# Patient Record
Sex: FEMALE | Race: WHITE | NOT HISPANIC OR LATINO | Employment: OTHER | URBAN - METROPOLITAN AREA
[De-identification: names, ages, dates, MRNs, and addresses within clinical notes are randomized per-mention and may not be internally consistent; named-entity substitution may affect disease eponyms.]

---

## 2017-01-23 ENCOUNTER — APPOINTMENT (OUTPATIENT)
Dept: WOMENS IMAGING | Facility: HOSPITAL | Age: 72
End: 2017-01-23

## 2017-01-23 PROCEDURE — 77063 BREAST TOMOSYNTHESIS BI: CPT | Performed by: RADIOLOGY

## 2017-01-23 PROCEDURE — 77067 SCR MAMMO BI INCL CAD: CPT | Performed by: RADIOLOGY

## 2018-10-04 ENCOUNTER — APPOINTMENT (OUTPATIENT)
Dept: WOMENS IMAGING | Facility: HOSPITAL | Age: 73
End: 2018-10-04

## 2018-10-04 PROCEDURE — 77066 DX MAMMO INCL CAD BI: CPT | Performed by: RADIOLOGY

## 2018-10-04 PROCEDURE — 77062 BREAST TOMOSYNTHESIS BI: CPT | Performed by: RADIOLOGY

## 2018-10-04 PROCEDURE — 76641 ULTRASOUND BREAST COMPLETE: CPT | Performed by: RADIOLOGY

## 2020-06-29 ENCOUNTER — OFFICE VISIT (OUTPATIENT)
Dept: CARDIOLOGY | Facility: CLINIC | Age: 75
End: 2020-06-29

## 2020-06-29 VITALS
HEART RATE: 79 BPM | DIASTOLIC BLOOD PRESSURE: 80 MMHG | TEMPERATURE: 96.9 F | SYSTOLIC BLOOD PRESSURE: 160 MMHG | HEIGHT: 64 IN | BODY MASS INDEX: 31.89 KG/M2 | WEIGHT: 186.8 LBS

## 2020-06-29 DIAGNOSIS — R06.02 SHORTNESS OF BREATH: ICD-10-CM

## 2020-06-29 DIAGNOSIS — E78.1 HYPERTRIGLYCERIDEMIA: ICD-10-CM

## 2020-06-29 DIAGNOSIS — R73.9 HYPERGLYCEMIA: ICD-10-CM

## 2020-06-29 DIAGNOSIS — R07.89 CHEST PRESSURE: Primary | ICD-10-CM

## 2020-06-29 DIAGNOSIS — I10 ESSENTIAL HYPERTENSION: ICD-10-CM

## 2020-06-29 DIAGNOSIS — E88.81 METABOLIC SYNDROME: ICD-10-CM

## 2020-06-29 PROBLEM — E88.810 METABOLIC SYNDROME: Status: ACTIVE | Noted: 2020-06-29

## 2020-06-29 PROCEDURE — 93000 ELECTROCARDIOGRAM COMPLETE: CPT | Performed by: INTERNAL MEDICINE

## 2020-06-29 PROCEDURE — 99204 OFFICE O/P NEW MOD 45 MIN: CPT | Performed by: INTERNAL MEDICINE

## 2020-06-29 RX ORDER — ZINC GLUCONATE 50 MG
1 TABLET ORAL DAILY
COMMUNITY

## 2020-06-29 RX ORDER — LISINOPRIL 10 MG/1
10 TABLET ORAL DAILY
Qty: 30 TABLET | Refills: 11 | Status: SHIPPED | OUTPATIENT
Start: 2020-06-29 | End: 2021-02-17

## 2020-06-29 RX ORDER — ASPIRIN 81 MG/1
81 TABLET, CHEWABLE ORAL DAILY
COMMUNITY

## 2020-06-29 RX ORDER — MONTELUKAST SODIUM 10 MG/1
10 TABLET ORAL NIGHTLY
COMMUNITY

## 2020-06-29 RX ORDER — VIT A/VIT C/VIT E/ZINC/COPPER 4296-226
2 CAPSULE ORAL DAILY
COMMUNITY

## 2020-06-29 RX ORDER — TRIAMTERENE AND HYDROCHLOROTHIAZIDE 37.5; 25 MG/1; MG/1
TABLET ORAL
COMMUNITY
End: 2021-12-14 | Stop reason: SDUPTHER

## 2020-06-29 RX ORDER — METOPROLOL TARTRATE 50 MG/1
25 TABLET, FILM COATED ORAL 2 TIMES DAILY
COMMUNITY
End: 2021-12-14 | Stop reason: SDUPTHER

## 2020-06-29 NOTE — PROGRESS NOTES
Chief Complaint   Patient presents with   • Establish Care     PCP has referred for Dyspnes and Angina. Had Stress test in 2011 , results obtained. . Has strong family history of cardiac issues.   • Hypertension     Is having problems with Systolic reading high   • Med Refill     Had medication list with her today.   • Shortness of Breath     Has SOA with exertion, she reports has improved.        CARDIAC COMPLAINTS  chest pressure/discomfort, dyspnea and fatigue      Subjective   Khushboo Werner is a 75 y.o. female came in today for her initial cardiac evaluation.  She has history of hypertension and a family history of ischemic heart disease.  She has undergone a stress test in 2011 for chest pain and found to have poor exercise tolerance, increased chronotropic and blood pressure response, normal LV systolic function and no obvious ischemia.  She apparently was put on beta-blockers at that time.  Recently she has been having problems in controlling her blood pressure as well as having chest discomfort.  The chest discomfort is pressure-like feeling in the middle part of the chest which occurs mostly on exertion.  In a scale of 1-10 it can go up to 6 or 8 and normally it lasts for few minutes and subsides some of the episode is associated with diaphoresis.  She does notice increasing shortness of breath with exertion.  The symptoms do get better if she takes rest.  She denies having any palpitation, dizziness or loss of consciousness.  She denies having any orthopnea.  She did undergo some lab work recently and found to have mildly elevated blood sugar.  Her cholesterol is 154 with a triglyceride of 134 LDL of 87.  Her A1c was slightly elevated at 6.7.  She has no history of smoking but she does have a history of secondhand smoking.  Her brother has multiple problems including renal disease.  Her father did die of a heart attack.    Past Surgical History:   Procedure Laterality Date   • BREAST LUMPECTOMY Left 2011   •  CARDIOVASCULAR STRESS TEST  09/13/2011    1 Min. 36 Secs. 4.6 METS. 108% THR. BP- 220/90. EF > 65%. Negative.   • CARPAL TUNNEL RELEASE Right    • CATARACT EXTRACTION Bilateral 2017   • ECHO - CONVERTED  09/13/2011    Tachycardic. EF > 60%. Mild MR. RVSP- 29 mmHg.   • GALLBLADDER SURGERY  2017   • ROTATOR CUFF REPAIR Right 2015       Current Outpatient Medications   Medication Sig Dispense Refill   • aspirin 81 MG chewable tablet Chew 81 mg Daily.     • Calcium Carbonate-Vitamin D (CALTRATE 600+D PO) Take 2 tablets by mouth Daily.     • Glucosamine-Chondroitin (Osteo Bi-Flex Regular Strength) 250-200 MG tablet Take 1 tablet by mouth Daily.     • metoprolol tartrate (LOPRESSOR) 50 MG tablet Take 50 mg by mouth 2 (Two) Times a Day. 1/2 tablet twice daily     • montelukast (SINGULAIR) 10 MG tablet Take 10 mg by mouth Every Night.     • Multiple Vitamins-Minerals (ICAPS) capsule Take 2 tablets by mouth Daily.     • triamterene-hydrochlorothiazide (MAXZIDE-25) 37.5-25 MG per tablet Take 1 tablet by mouth Daily. Takes 1/2 tablet daily     • Zinc 50 MG tablet Take 1 tablet by mouth Daily.     • lisinopril (PRINIVIL,ZESTRIL) 10 MG tablet Take 1 tablet by mouth Daily. 30 tablet 11     No current facility-administered medications for this visit.            ALLERGIES:  Sulfa antibiotics    Past Medical History:   Diagnosis Date   • Cancer (CMS/HCC) 2011    Left breast    • Hypertension        Social History     Tobacco Use   Smoking Status Never Smoker   Smokeless Tobacco Never Used          Family History   Problem Relation Age of Onset   • Lung disease Mother    • Heart attack Father    • Hypertension Sister    • Cancer Sister    • Kidney disease Brother    • Heart disease Brother    • Diabetes Brother        Review of Systems   Constitution: Positive for malaise/fatigue. Negative for decreased appetite.   HENT: Negative for congestion and sore throat.    Eyes: Negative for blurred vision.   Cardiovascular: Positive for  "chest pain and dyspnea on exertion.   Respiratory: Positive for shortness of breath. Negative for snoring.    Endocrine: Negative for cold intolerance and heat intolerance.   Hematologic/Lymphatic: Negative for adenopathy. Does not bruise/bleed easily.   Skin: Negative for itching, nail changes and skin cancer.   Musculoskeletal: Negative for arthritis and myalgias.   Gastrointestinal: Negative for abdominal pain, dysphagia and heartburn.   Genitourinary: Negative for bladder incontinence and frequency.   Neurological: Negative for dizziness, light-headedness, seizures and vertigo.   Psychiatric/Behavioral: Negative for altered mental status.   Allergic/Immunologic: Negative for environmental allergies and hives.       Diabetes- No  Thyroid- normal    Objective     /80 (BP Location: Right arm)   Pulse 79   Temp 96.9 °F (36.1 °C)   Ht 162.6 cm (64\")   Wt 84.7 kg (186 lb 12.8 oz)   BMI 32.06 kg/m²     Physical Exam   Constitutional: She is oriented to person, place, and time. She appears well-developed and well-nourished.   HENT:   Head: Normocephalic.   Nose: Nose normal.   Eyes: Pupils are equal, round, and reactive to light. EOM are normal.   Neck: Normal range of motion. Neck supple.   Cardiovascular: Normal rate, regular rhythm, S1 normal and S2 normal.   Murmur heard.  Pulmonary/Chest: Effort normal and breath sounds normal.   Abdominal: Soft. Bowel sounds are normal.   Musculoskeletal: Normal range of motion. She exhibits no edema.   Neurological: She is alert and oriented to person, place, and time.   Skin: Skin is warm and dry.   Psychiatric: She has a normal mood and affect.         ECG 12 Lead  Date/Time: 6/29/2020 12:44 PM  Performed by: Jin Mae MD  Authorized by: Jin Mae MD   Previous ECG: no previous ECG available  Rhythm: sinus rhythm  Rate: normal  Other findings: non-specific ST-T wave changes    Clinical impression: non-specific ECG              Assessment/Plan   "   Patient's Body mass index is 32.06 kg/m². BMI is above normal parameters. Recommendations include: educational material, exercise counseling and nutrition counseling.     Khushboo was seen today for establish care, hypertension, med refill and shortness of breath.    Diagnoses and all orders for this visit:    Chest pressure  -     Stress Test With Myocardial Perfusion One Day; Future    Essential hypertension  -     lisinopril (PRINIVIL,ZESTRIL) 10 MG tablet; Take 1 tablet by mouth Daily.  -     Stress Test With Myocardial Perfusion One Day; Future    Hyperglycemia  -     Stress Test With Myocardial Perfusion One Day; Future    Hypertriglyceridemia    Shortness of breath  -     Adult Transthoracic Echo Complete W/ Cont if Necessary Per Protocol; Future    Metabolic syndrome       At baseline her heart rate is stable.  Blood pressure is elevated.  Her EKG shows sinus rhythm with diffuse nonspecific ST-T changes seen.  Her clinical examination reveals a BMI of 32.  She does have slightly loud second heart sound and a short systolic murmur at the mitral area.  She has normal peripheral pulse and no pedal edema.    The discomfort she describes appears to be anginal-like.  It does occurs mostly during the daytime.  Some of them are associated with chest tightness.  She need to undergo a stress test to evaluate for ischemia.  Since she is still not able to walk much.  I scheduled her stress test as a Lexiscan to evaluate for any ischemia.  I explained to her that if there is evidence of ischemia, then she may need to undergo elective cardiac catheterization.    Regarding her blood pressure, I did add lisinopril 10 mg once a day.  If her blood pressure continues to be elevated then she may need work-up was secondary hypertension.    Regarding her hypertriglyceridemia, I talked to her about the elevated sugar.  I talked to her about cutting down on the past.  I gave her papers on Mediterranean diet    Regarding the  hypertriglyceridemia, at this time we will try to treat with dietary changes.  If the triglyceride continues to be elevated, then she may need to be on a fenofibrate    The shortness of breath she has could be secondary to the metabolic syndrome but also could be from cardiac.  I scheduled her to undergo an echocardiogram to evaluate the LV function, valvular structures and PA pressure.    Based on the results, further recommendations will be made.             Electronically signed by Jin Mae MD June 29, 2020 12:22

## 2020-06-29 NOTE — PATIENT INSTRUCTIONS
Mediterranean Diet  A Mediterranean diet refers to food and lifestyle choices that are based on the traditions of countries located on the [place]. This way of eating has been shown to help prevent certain conditions and improve outcomes for people who have chronic diseases, like kidney disease and heart disease.  What are tips for following this plan?  Lifestyle  · Cook and eat meals together with your family, when possible.  · Drink enough fluid to keep your urine clear or pale yellow.  · Be physically active every day. This includes:  ? Aerobic exercise like running or swimming.  ? Leisure activities like gardening, walking, or housework.  · Get 7-8 hours of sleep each night.  · If recommended by your health care provider, drink red wine in moderation. This means 1 glass a day for nonpregnant women and 2 glasses a day for men. A glass of wine equals 5 oz (150 mL).  Reading food labels    · Check the serving size of packaged foods. For foods such as rice and pasta, the serving size refers to the amount of cooked product, not dry.  · Check the total fat in packaged foods. Avoid foods that have saturated fat or trans fats.  · Check the ingredients list for added sugars, such as corn syrup.  Shopping  · At the grocery store, buy most of your food from the areas near the walls of the store. This includes:  ? Fresh fruits and vegetables (produce).  ? Grains, beans, nuts, and seeds. Some of these may be available in unpackaged forms or large amounts (in bulk).  ? Fresh seafood.  ? Poultry and eggs.  ? Low-fat dairy products.  · Buy whole ingredients instead of prepackaged foods.  · Buy fresh fruits and vegetables in-season from local farmers markets.  · Buy frozen fruits and vegetables in resealable bags.  · If you do not have access to quality fresh seafood, buy precooked frozen shrimp or canned fish, such as tuna, salmon, or sardines.  · Buy small amounts of raw or cooked vegetables, salads, or olives from  the deli or salad bar at your store.  · Stock your pantry so you always have certain foods on hand, such as olive oil, canned tuna, canned tomatoes, rice, pasta, and beans.  Cooking  · Cook foods with extra-virgin olive oil instead of using butter or other vegetable oils.  · Have meat as a side dish, and have vegetables or grains as your main dish. This means having meat in small portions or adding small amounts of meat to foods like pasta or stew.  · Use beans or vegetables instead of meat in common dishes like chili or lasagna.  · [place] with different cooking methods. Try roasting or broiling vegetables instead of steaming or sautéeing them.  · Add frozen vegetables to soups, stews, pasta, or rice.  · Add nuts or seeds for added healthy fat at each meal. You can add these to yogurt, salads, or vegetable dishes.  · Marinate fish or vegetables using olive oil, lemon juice, garlic, and fresh herbs.  Meal planning    · Plan to eat 1 vegetarian meal one day each week. Try to work up to 2 vegetarian meals, if possible.  · Eat seafood 2 or more times a week.  · Have healthy snacks readily available, such as:  ? Vegetable sticks with hummus.  ? Greek yogurt.  ? Fruit and nut trail mix.  · Eat balanced meals throughout the week. This includes:  ? Fruit: 2-3 servings a day  ? Vegetables: 4-5 servings a day  ? Low-fat dairy: 2 servings a day  ? Fish, poultry, or lean meat: 1 serving a day  ? Beans and legumes: 2 or more servings a week  ? Nuts and seeds: 1-2 servings a day  ? Whole grains: 6-8 servings a day  ? Extra-virgin olive oil: 3-4 servings a day  · Limit red meat and sweets to only a few servings a month  What are my food choices?  · Mediterranean diet  ? Recommended  § Grains: Whole-grain pasta. Brown rice. Bulgar wheat. Polenta. Couscous. Whole-wheat bread. Oatmeal. Quinoa.  § Vegetables: Artichokes. Beets. Broccoli. Cabbage. Carrots. Eggplant. Green beans. Chard. Kale. Spinach. Onions. Leeks. Peas. Squash.  Tomatoes. Peppers. Radishes.  § Fruits: Apples. Apricots. Avocado. Berries. Bananas. Cherries. Dates. Figs. Grapes. Akash. Melon. Oranges. Peaches. Plums. Pomegranate.  § Meats and other protein foods: Beans. Almonds. Sunflower seeds. Pine nuts. Peanuts. Cod. [place]. Scallops. Shrimp. Tuna. Tilapia. Clams. Oysters. Eggs.  § Dairy: Low-fat milk. Cheese. Greek yogurt.  § Beverages: Water. Red wine. Herbal tea.  § Fats and oils: Extra virgin olive oil. Avocado oil. Grape seed oil.  § Sweets and desserts: Greek yogurt with honey. Baked apples. Poached pears. Trail mix.  § Seasoning and other foods: Basil. Cilantro. Coriander. Cumin. Mint. Parsley. Yao. Rosemary. Tarragon. Garlic. Oregano. Thyme. Pepper. Balsalmic vinegar. Tahini. Hummus. Tomato sauce. Olives. Mushrooms.  ? Limit these  § Grains: Prepackaged pasta or rice dishes. Prepackaged cereal with added sugar.  § Vegetables: Deep fried potatoes (french fries).  § Fruits: Fruit canned in syrup.  § Meats and other protein foods: Beef. Pork. Lamb. Poultry with skin. Hot dogs. [place].  § Dairy: Ice cream. Sour cream. Whole milk.  § Beverages: Juice. Sugar-sweetened soft drinks. Beer. Liquor and spirits.  § Fats and oils: Butter. Canola oil. Vegetable oil. Beef fat (tallow). Lard.  § Sweets and desserts: Cookies. Cakes. Pies. Candy.  § Seasoning and other foods: Mayonnaise. Premade sauces and marinades.  The items listed may not be a complete list. Talk with your dietitian about what dietary choices are right for you.  Summary  · The Mediterranean diet includes both food and lifestyle choices.  · Eat a variety of fresh fruits and vegetables, beans, nuts, seeds, and whole grains.  · Limit the amount of red meat and sweets that you eat.  · Talk with your health care provider about whether it is safe for you to drink red wine in moderation. This means 1 glass a day for nonpregnant women and 2 glasses a day for men. A glass of wine equals 5 oz (150 mL).  This information  is not intended to replace advice given to you by your health care provider. Make sure you discuss any questions you have with your health care provider.  Document Released: 08/10/2017 Document Revised: 08/17/2017 Document Reviewed: 08/10/2017  Elsevier Patient Education © 2020 [employer]

## 2020-07-01 ENCOUNTER — HOSPITAL ENCOUNTER (OUTPATIENT)
Dept: CARDIOLOGY | Facility: HOSPITAL | Age: 75
Discharge: HOME | End: 2020-07-01

## 2020-07-01 DIAGNOSIS — R07.89 CHEST PRESSURE: ICD-10-CM

## 2020-07-01 DIAGNOSIS — R06.02 SHORTNESS OF BREATH: ICD-10-CM

## 2020-07-01 DIAGNOSIS — R73.9 HYPERGLYCEMIA: ICD-10-CM

## 2020-07-01 DIAGNOSIS — I10 ESSENTIAL HYPERTENSION: ICD-10-CM

## 2020-07-01 LAB
BH CV ECHO MEAS - ACS: 1.7 CM
BH CV ECHO MEAS - AO MAX PG: 6.6 MMHG
BH CV ECHO MEAS - AO MEAN PG: 4 MMHG
BH CV ECHO MEAS - AO ROOT AREA (BSA CORRECTED): 1.3
BH CV ECHO MEAS - AO ROOT AREA: 4.9 CM^2
BH CV ECHO MEAS - AO ROOT DIAM: 2.5 CM
BH CV ECHO MEAS - AO V2 MAX: 128 CM/SEC
BH CV ECHO MEAS - AO V2 MEAN: 90.7 CM/SEC
BH CV ECHO MEAS - AO V2 VTI: 27.1 CM
BH CV ECHO MEAS - BSA(HAYCOCK): 2 M^2
BH CV ECHO MEAS - BSA: 1.9 M^2
BH CV ECHO MEAS - BZI_BMI: 31.9 KILOGRAMS/M^2
BH CV ECHO MEAS - BZI_METRIC_HEIGHT: 162.6 CM
BH CV ECHO MEAS - BZI_METRIC_WEIGHT: 84.4 KG
BH CV ECHO MEAS - EDV(CUBED): 19.2 ML
BH CV ECHO MEAS - EDV(MOD-SP4): 61.2 ML
BH CV ECHO MEAS - EDV(TEICH): 26.5 ML
BH CV ECHO MEAS - EF(CUBED): 70.2 %
BH CV ECHO MEAS - EF(MOD-SP4): 59.6 %
BH CV ECHO MEAS - EF(TEICH): 63.9 %
BH CV ECHO MEAS - ESV(CUBED): 5.7 ML
BH CV ECHO MEAS - ESV(MOD-SP4): 24.7 ML
BH CV ECHO MEAS - ESV(TEICH): 9.6 ML
BH CV ECHO MEAS - FS: 33.2 %
BH CV ECHO MEAS - IVS/LVPW: 0.86
BH CV ECHO MEAS - IVSD: 1.3 CM
BH CV ECHO MEAS - LA DIMENSION: 3.3 CM
BH CV ECHO MEAS - LA/AO: 1.3
BH CV ECHO MEAS - LV DIASTOLIC VOL/BSA (35-75): 32.3 ML/M^2
BH CV ECHO MEAS - LV IVRT: 0.1 SEC
BH CV ECHO MEAS - LV MASS(C)D: 116.3 GRAMS
BH CV ECHO MEAS - LV MASS(C)DI: 61.3 GRAMS/M^2
BH CV ECHO MEAS - LV SYSTOLIC VOL/BSA (12-30): 13 ML/M^2
BH CV ECHO MEAS - LVIDD: 2.7 CM
BH CV ECHO MEAS - LVIDS: 1.8 CM
BH CV ECHO MEAS - LVLD AP4: 6.7 CM
BH CV ECHO MEAS - LVLS AP4: 5.4 CM
BH CV ECHO MEAS - LVOT AREA (M): 2.8 CM^2
BH CV ECHO MEAS - LVOT AREA: 2.8 CM^2
BH CV ECHO MEAS - LVOT DIAM: 1.9 CM
BH CV ECHO MEAS - LVPWD: 1.5 CM
BH CV ECHO MEAS - MV A MAX VEL: 104 CM/SEC
BH CV ECHO MEAS - MV DEC SLOPE: 192 CM/SEC^2
BH CV ECHO MEAS - MV E MAX VEL: 72 CM/SEC
BH CV ECHO MEAS - MV E/A: 0.69
BH CV ECHO MEAS - RVDD: 2.5 CM
BH CV ECHO MEAS - SI(AO): 70.1 ML/M^2
BH CV ECHO MEAS - SI(CUBED): 7.1 ML/M^2
BH CV ECHO MEAS - SI(MOD-SP4): 19.2 ML/M^2
BH CV ECHO MEAS - SI(TEICH): 8.9 ML/M^2
BH CV ECHO MEAS - SV(AO): 133 ML
BH CV ECHO MEAS - SV(CUBED): 13.5 ML
BH CV ECHO MEAS - SV(MOD-SP4): 36.5 ML
BH CV ECHO MEAS - SV(TEICH): 16.9 ML
BH CV STRESS COMMENTS STAGE 1: NORMAL
BH CV STRESS DOSE REGADENOSON STAGE 1: 0.4
BH CV STRESS DURATION MIN STAGE 1: 0
BH CV STRESS DURATION SEC STAGE 1: 10
BH CV STRESS PROTOCOL 1: NORMAL
BH CV STRESS RECOVERY BP: NORMAL MMHG
BH CV STRESS RECOVERY HR: 91 BPM
BH CV STRESS STAGE 1: 1
LV EF NUC BP: 77 %
MAXIMAL PREDICTED HEART RATE: 147 BPM
MAXIMAL PREDICTED HEART RATE: 147 BPM
PERCENT MAX PREDICTED HR: 67.35 %
STRESS BASELINE BP: NORMAL MMHG
STRESS BASELINE HR: 74 BPM
STRESS PERCENT HR: 79 %
STRESS POST PEAK BP: NORMAL MMHG
STRESS POST PEAK HR: 99 BPM
STRESS TARGET HR: 125 BPM
STRESS TARGET HR: 125 BPM

## 2020-07-01 PROCEDURE — 78452 HT MUSCLE IMAGE SPECT MULT: CPT

## 2020-07-01 PROCEDURE — 93016 CV STRESS TEST SUPVJ ONLY: CPT | Performed by: NURSE PRACTITIONER

## 2020-07-01 PROCEDURE — 93306 TTE W/DOPPLER COMPLETE: CPT

## 2020-07-01 PROCEDURE — 93017 CV STRESS TEST TRACING ONLY: CPT

## 2020-07-01 PROCEDURE — 93018 CV STRESS TEST I&R ONLY: CPT | Performed by: INTERNAL MEDICINE

## 2020-07-01 PROCEDURE — 0 TECHNETIUM SESTAMIBI: Performed by: INTERNAL MEDICINE

## 2020-07-01 PROCEDURE — 25010000002 REGADENOSON 0.4 MG/5ML SOLUTION: Performed by: INTERNAL MEDICINE

## 2020-07-01 PROCEDURE — A9500 TC99M SESTAMIBI: HCPCS | Performed by: INTERNAL MEDICINE

## 2020-07-01 PROCEDURE — 78452 HT MUSCLE IMAGE SPECT MULT: CPT | Performed by: INTERNAL MEDICINE

## 2020-07-01 PROCEDURE — 93306 TTE W/DOPPLER COMPLETE: CPT | Performed by: INTERNAL MEDICINE

## 2020-07-01 RX ADMIN — REGADENOSON 0.4 MG: 0.08 INJECTION, SOLUTION INTRAVENOUS at 09:57

## 2020-07-01 RX ADMIN — TECHNETIUM TC 99M SESTAMIBI 1 DOSE: 1 INJECTION INTRAVENOUS at 09:57

## 2020-07-01 RX ADMIN — TECHNETIUM TC 99M SESTAMIBI 1 DOSE: 1 INJECTION INTRAVENOUS at 09:56

## 2020-07-02 ENCOUNTER — TELEPHONE (OUTPATIENT)
Dept: CARDIOLOGY | Facility: CLINIC | Age: 75
End: 2020-07-02

## 2020-07-02 RX ORDER — ISOSORBIDE MONONITRATE 30 MG/1
30 TABLET, EXTENDED RELEASE ORAL DAILY
Qty: 30 TABLET | Refills: 6 | Status: SHIPPED | OUTPATIENT
Start: 2020-07-02 | End: 2020-10-23

## 2020-07-02 NOTE — TELEPHONE ENCOUNTER
Patient aware of stress test and echo results and recommendations to add Imdur 30 mg daily and to call the office if she continues to have symptoms.  She denies chest pain, currently.

## 2020-10-15 ENCOUNTER — TELEPHONE (OUTPATIENT)
Dept: CARDIOLOGY | Facility: CLINIC | Age: 75
End: 2020-10-15

## 2020-10-15 NOTE — TELEPHONE ENCOUNTER
Received fax from Dr. Orozco for cardiac clearance for patient to have a breast biopsy. Patient is on aspirin and they are requesting to hold for 5 days prior to procedure. According to our records, I do not see where patient has had any stenting. Patient had a stress test on 07/01/2020 Lexiscan- EF 77%. R/O Apical Ischemia.    Fax [phone]

## 2020-10-23 RX ORDER — ISOSORBIDE MONONITRATE 30 MG/1
TABLET, EXTENDED RELEASE ORAL
Qty: 90 TABLET | Refills: 2 | Status: SHIPPED | OUTPATIENT
Start: 2020-10-23 | End: 2021-05-25 | Stop reason: SDUPTHER

## 2020-11-17 ENCOUNTER — OFFICE VISIT (OUTPATIENT)
Dept: CARDIOLOGY | Facility: CLINIC | Age: 75
End: 2020-11-17

## 2020-11-17 VITALS
DIASTOLIC BLOOD PRESSURE: 60 MMHG | BODY MASS INDEX: 29.19 KG/M2 | TEMPERATURE: 97.1 F | HEART RATE: 64 BPM | SYSTOLIC BLOOD PRESSURE: 120 MMHG | HEIGHT: 64 IN | WEIGHT: 171 LBS

## 2020-11-17 DIAGNOSIS — R06.02 SHORTNESS OF BREATH: ICD-10-CM

## 2020-11-17 DIAGNOSIS — I10 ESSENTIAL HYPERTENSION: Primary | ICD-10-CM

## 2020-11-17 DIAGNOSIS — E11.9 TYPE 2 DIABETES MELLITUS WITHOUT COMPLICATION, WITHOUT LONG-TERM CURRENT USE OF INSULIN (HCC): ICD-10-CM

## 2020-11-17 PROCEDURE — 99213 OFFICE O/P EST LOW 20 MIN: CPT | Performed by: NURSE PRACTITIONER

## 2020-11-17 RX ORDER — LETROZOLE 2.5 MG/1
TABLET, FILM COATED ORAL DAILY
COMMUNITY

## 2020-11-17 NOTE — PROGRESS NOTES
Chief Complaint   Patient presents with   • Follow-up     Cardiac management.   • Lab     Has copy of most recent labs. Does not need refills at this time.   • Weight loss     Has only been eating one meal a day, she is still eating snacks.     Randy Werner is a 75 y.o. female with hypertension, diet controlled diabetes and strong family history of IHD who was referred back for cardiac evaluation. She underwent a stress test in 2011 for chest pain found to have hypertensive response but no ischemia. Beta blockers started. She was diagnosed with (L) breast CA in 2018 and underwent lumpectomy and radiation. Recently she noticed increasing dyspnea on exertion. Cardiac work up was repeated with Lexiscan showing normal blood pressure response and mild changes in the apex. LV function was normal. Imdur was started with plan for cath if symptoms persisted. Lipids 6/2020 were well controlled with LDL 87, HDL 40, Tri 134, . A1C 6.7%.     She returns today for follow up. She denies anginal symptoms. VELASCO improved. She has no cardiac complaints today. Tolerating Imdur. She has followed a strict diet and has lost 15 pounds. Labs brought in today showed normal CBC, CMP, A1C 6.8%. She continues to be managed with diet.          Cardiac History:    Past Surgical History:   Procedure Laterality Date   • BREAST LUMPECTOMY Left 2011   • CARDIOVASCULAR STRESS TEST  09/13/2011    1 Min. 36 Secs. 4.6 METS. 108% THR. BP- 220/90. EF > 65%. Negative.   • CARDIOVASCULAR STRESS TEST  07/01/2020    Lexiscan- EF 77%. R/O Apical Ischemia.   • CARPAL TUNNEL RELEASE Right    • CATARACT EXTRACTION Bilateral 2017   • ECHO - CONVERTED  09/13/2011    Tachycardic. EF > 60%. Mild MR. RVSP- 29 mmHg.   • ECHO - CONVERTED  07/01/2020    EF 65%. Trace-Mild MR   • GALLBLADDER SURGERY  2017   • ROTATOR CUFF REPAIR Right 2015       Current Outpatient Medications   Medication Sig Dispense Refill   • aspirin 81 MG chewable tablet Chew 81 mg  Daily.     • Calcium Carbonate-Vitamin D (CALTRATE 600+D PO) Take 2 tablets by mouth Daily.     • Glucosamine-Chondroitin (Osteo Bi-Flex Regular Strength) 250-200 MG tablet Take 1 tablet by mouth Daily.     • isosorbide mononitrate (IMDUR) 30 MG 24 hr tablet TAKE 1 TABLET BY MOUTH EVERY DAY 90 tablet 2   • letrozole (FEMARA) 2.5 MG tablet Take  by mouth Daily.     • lisinopril (PRINIVIL,ZESTRIL) 10 MG tablet Take 1 tablet by mouth Daily. 30 tablet 11   • metoprolol tartrate (LOPRESSOR) 50 MG tablet Take 25 mg by mouth 2 (Two) Times a Day.     • montelukast (SINGULAIR) 10 MG tablet Take 10 mg by mouth Every Night.     • Multiple Vitamins-Minerals (ICAPS) capsule Take 2 tablets by mouth Daily.     • triamterene-hydrochlorothiazide (MAXZIDE-25) 37.5-25 MG per tablet Takes 1/2 tablet daily      • Zinc 50 MG tablet Take 1 tablet by mouth Daily.       No current facility-administered medications for this visit.      Sulfa antibiotics    Past Medical History:   Diagnosis Date   • Cancer (CMS/[facility]) 2011    Left breast    • Hypertension      Social History     Socioeconomic History   • Marital status:      Spouse name: Not on file   • Number of children: Not on file   • Years of education: Not on file   • Highest education level: Not on file   Tobacco Use   • Smoking status: Never Smoker   • Smokeless tobacco: Never Used   Substance and Sexual Activity   • Alcohol use: Not Currently   • Drug use: Never   • Sexual activity: Defer     Family History   Problem Relation Age of Onset   • Lung disease Mother    • Heart attack Father    • Hypertension Sister    • Cancer Sister    • Kidney disease Brother    • Heart disease Brother    • Diabetes Brother      Review of Systems   Constitution: Positive for weight loss (with diet). Negative for decreased appetite and malaise/fatigue.   HENT: Negative.    Eyes: Negative for blurred vision.   Cardiovascular: Positive for dyspnea on exertion (improved). Negative for chest pain, leg  "swelling, palpitations and syncope.   Respiratory: Negative for shortness of breath and sleep disturbances due to breathing.    Endocrine: Negative.    Hematologic/Lymphatic: Negative for bleeding problem. Does not bruise/bleed easily.   Skin: Negative.    Musculoskeletal: Negative for falls and myalgias.   Gastrointestinal: Negative for abdominal pain, heartburn and melena.   Genitourinary: Negative for hematuria.   Neurological: Negative for dizziness and light-headedness.   Psychiatric/Behavioral: Negative for altered mental status.   Allergic/Immunologic: Negative.       Objective     /60 (BP Location: Right arm)   Pulse 64   Temp 97.1 °F (36.2 °C)   Ht 162.6 cm (64.02\")   Wt 77.6 kg (171 lb)   BMI 29.34 kg/m²     Vitals signs and nursing note reviewed.   Constitutional:       General: Not in acute distress.     Appearance: Well-developed. Not diaphoretic.   Eyes:      Pupils: Pupils are equal, round, and reactive to light.   HENT:      Head: Normocephalic.   Neck:      Musculoskeletal: Normal range of motion.   Pulmonary:      Effort: Pulmonary effort is normal. No respiratory distress.      Breath sounds: Normal breath sounds.   Cardiovascular:      Normal rate. Regular rhythm.   Pulses:     Intact distal pulses.   Abdominal:      General: Bowel sounds are normal.      Palpations: Abdomen is soft.   Musculoskeletal: Normal range of motion.   Skin:     General: Skin is warm and dry.   Neurological:      Mental Status: Alert and oriented to person, place, and time.        Procedures          Problem List Items Addressed This Visit        Cardiovascular and Mediastinum    Essential hypertension - Primary       Respiratory    Shortness of breath       Endocrine    Type 2 diabetes mellitus without complication, without long-term current use of insulin (CMS/[facility])         1. HTN- well controlled. Continue metoprolol, lisinopril. Limit Na. She was congratulated on her weight loss.     2. DM- A1C remains " 6.8%. Continue strict diet. Continue ACE inhibitor, aspirin.     3. VELASCO/SOB- reviewed stress test findings with her showing possible apical ischemia.  Symptoms have improved with isosorbide. Continue medical management. She agrees to contact office should she develop any new or worsening symptoms. Cardiac cath will be recommended.     No changes made today. Continue risk factor modification. Follow up in six months.     Patient's Body mass index is 29.34 kg/m². BMI is above normal parameters. Recommendations include: nutrition counseling.            Electronically signed by KAMLESH Roberts,  November 17, 2020 11:30 EST

## 2021-02-15 DIAGNOSIS — I10 ESSENTIAL HYPERTENSION: ICD-10-CM

## 2021-02-17 RX ORDER — LISINOPRIL 10 MG/1
TABLET ORAL
Qty: 90 TABLET | Refills: 3 | Status: SHIPPED | OUTPATIENT
Start: 2021-02-17 | End: 2021-12-14 | Stop reason: SDUPTHER

## 2021-04-15 ENCOUNTER — HOSPITAL ENCOUNTER (OUTPATIENT)
Dept: MRI IMAGING | Facility: HOSPITAL | Age: 76
Discharge: HOME | End: 2021-04-15
Admitting: RADIOLOGY

## 2021-04-15 DIAGNOSIS — Z85.3 PERSONAL HISTORY OF MALIGNANT NEOPLASM OF BREAST: ICD-10-CM

## 2021-04-15 LAB — CREAT BLDA-MCNC: 1 MG/DL (ref 0.6–1.3)

## 2021-04-15 PROCEDURE — A9577 INJ MULTIHANCE: HCPCS

## 2021-04-15 PROCEDURE — C8937 CAD BREAST MRI: HCPCS

## 2021-04-15 PROCEDURE — 0 GADOBENATE DIMEGLUMINE 529 MG/ML SOLUTION

## 2021-04-15 PROCEDURE — 77049 MRI BREAST C-+ W/CAD BI: CPT | Performed by: RADIOLOGY

## 2021-04-15 PROCEDURE — 82565 ASSAY OF CREATININE: CPT

## 2021-04-15 PROCEDURE — 0 GADOBENATE DIMEGLUMINE 529 MG/ML SOLUTION: Performed by: RADIOLOGY

## 2021-04-15 PROCEDURE — A9577 INJ MULTIHANCE: HCPCS | Performed by: RADIOLOGY

## 2021-04-15 PROCEDURE — C8908 MRI W/O FOL W/CONT, BREAST,: HCPCS

## 2021-04-15 RX ADMIN — GADOBENATE DIMEGLUMINE 15 ML: 529 INJECTION, SOLUTION INTRAVENOUS at 11:15

## 2021-05-25 ENCOUNTER — OFFICE VISIT (OUTPATIENT)
Dept: CARDIOLOGY | Facility: CLINIC | Age: 76
End: 2021-05-25

## 2021-05-25 VITALS
DIASTOLIC BLOOD PRESSURE: 60 MMHG | WEIGHT: 171 LBS | BODY MASS INDEX: 29.19 KG/M2 | HEART RATE: 72 BPM | SYSTOLIC BLOOD PRESSURE: 110 MMHG | HEIGHT: 64 IN

## 2021-05-25 DIAGNOSIS — E88.81 METABOLIC SYNDROME: ICD-10-CM

## 2021-05-25 DIAGNOSIS — E11.9 TYPE 2 DIABETES MELLITUS WITHOUT COMPLICATION, WITHOUT LONG-TERM CURRENT USE OF INSULIN (HCC): ICD-10-CM

## 2021-05-25 DIAGNOSIS — I10 ESSENTIAL HYPERTENSION: ICD-10-CM

## 2021-05-25 DIAGNOSIS — E78.1 HYPERTRIGLYCERIDEMIA: ICD-10-CM

## 2021-05-25 DIAGNOSIS — R42 DIZZINESS: Primary | ICD-10-CM

## 2021-05-25 PROCEDURE — 99213 OFFICE O/P EST LOW 20 MIN: CPT | Performed by: NURSE PRACTITIONER

## 2021-05-25 RX ORDER — ISOSORBIDE MONONITRATE 30 MG/1
30 TABLET, EXTENDED RELEASE ORAL DAILY
Qty: 90 TABLET | Refills: 2 | Status: SHIPPED | OUTPATIENT
Start: 2021-05-25 | End: 2021-12-14 | Stop reason: SDUPTHER

## 2021-05-25 RX ORDER — TRAZODONE HYDROCHLORIDE 50 MG/1
25 TABLET ORAL NIGHTLY
COMMUNITY
End: 2023-01-30

## 2021-05-25 NOTE — PROGRESS NOTES
Chief Complaint   Patient presents with   • Follow-up     Cardiac management   • Lab     Last labs couple months ago per PCP.   • Dizziness     PCP felt related to vertigo, has been having falls.   • Med Refill     Needs refills on Imdur-90 day.     Randy Werner is a 76 y.o. female with hypertension, diet controlled diabetes and strong family history of IHD who was referred back for cardiac evaluation. She underwent a stress test in 2011 for chest pain found to have hypertensive response but no ischemia. Beta blockers started. She was diagnosed with (L) breast CA in 2018 and underwent lumpectomy and radiation.   She was referred back in June 2020 after she noticed increasing dyspnea on exertion. Cardiac work up was repeated with Lexiscan showing normal blood pressure response and mild changes in the apex. LV function was normal. Imdur was started with plan for cath if symptoms persisted. Lipids 6/2020 were well controlled with diet alone, LDL 87, HDL 40, Tri 134, . A1C 6.7%.     She returns today for follow-up visit.  She denies having any chest discomfort.  She recently has noticed vertigo symptoms, she has a room spinning sensation, worse with turning her head.  She denies slurred speech, unilateral numbness.  She has difficulty with her vision secondary to macular degeneration.  Blood pressure has been well controlled.  She is concerned about her risk of stroke as her brother recently suffered a significant CVA.         Cardiac History:    Past Surgical History:   Procedure Laterality Date   • CARDIOVASCULAR STRESS TEST  09/13/2011    1 Min. 36 Secs. 4.6 METS. 108% THR. BP- 220/90. EF > 65%. Negative.   • CARDIOVASCULAR STRESS TEST  07/01/2020    Lexiscan- EF 77%. R/O Apical Ischemia.   • ECHO - CONVERTED  09/13/2011    Tachycardic. EF > 60%. Mild MR. RVSP- 29 mmHg.   • ECHO - CONVERTED  07/01/2020    EF 65%. Trace-Mild MR     Current Outpatient Medications   Medication Sig Dispense Refill    • aspirin 81 MG chewable tablet Chew 81 mg Daily.     • Calcium Carbonate-Vitamin D (CALTRATE 600+D PO) Take 2 tablets by mouth Daily.     • Glucosamine-Chondroitin (Osteo Bi-Flex Regular Strength) 250-200 MG tablet Take 1 tablet by mouth Daily.     • isosorbide mononitrate (IMDUR) 30 MG 24 hr tablet Take 1 tablet by mouth Daily. 90 tablet 2   • letrozole (FEMARA) 2.5 MG tablet Take  by mouth Daily.     • lisinopril (PRINIVIL,ZESTRIL) 10 MG tablet TAKE 1 TABLET BY MOUTH EVERY DAY 90 tablet 3   • metoprolol tartrate (LOPRESSOR) 50 MG tablet Take 25 mg by mouth 2 (Two) Times a Day.     • montelukast (SINGULAIR) 10 MG tablet Take 10 mg by mouth Every Night.     • Multiple Vitamins-Minerals (ICAPS) capsule Take 2 tablets by mouth Daily.     • traZODone (DESYREL) 50 MG tablet Take 25 mg by mouth Every Night.     • triamterene-hydrochlorothiazide (MAXZIDE-25) 37.5-25 MG per tablet Takes 1/2 tablet daily      • Zinc 50 MG tablet Take 1 tablet by mouth Daily.       No current facility-administered medications for this visit.     Sulfa antibiotics and Latex    Past Medical History:   Diagnosis Date   • Cancer (CMS/HCC) 2011    Left breast    • Hypertension      Social History     Socioeconomic History   • Marital status:      Spouse name: Not on file   • Number of children: Not on file   • Years of education: Not on file   • Highest education level: Not on file   Tobacco Use   • Smoking status: Never Smoker   • Smokeless tobacco: Never Used   Vaping Use   • Vaping Use: Never used   Substance and Sexual Activity   • Alcohol use: Not Currently   • Drug use: Never   • Sexual activity: Defer     Family History   Problem Relation Age of Onset   • Lung disease Mother    • Heart attack Father    • Hypertension Sister    • Cancer Sister    • Kidney disease Brother    • Heart disease Brother    • Diabetes Brother      Review of Systems   Constitutional: Negative for decreased appetite, malaise/fatigue, weight gain and weight  "loss.   HENT: Negative.    Eyes: Negative for blurred vision.   Cardiovascular: Negative for chest pain, dyspnea on exertion, leg swelling, palpitations and syncope.   Respiratory: Negative for shortness of breath and sleep disturbances due to breathing.    Endocrine: Negative.    Hematologic/Lymphatic: Negative for bleeding problem. Does not bruise/bleed easily.   Skin: Negative.    Musculoskeletal: Negative for falls and myalgias.   Gastrointestinal: Negative for abdominal pain, heartburn and melena.   Genitourinary: Negative for hematuria.   Neurological: Positive for dizziness and vertigo. Negative for light-headedness.   Psychiatric/Behavioral: Negative for altered mental status.   Allergic/Immunologic: Negative.         Objective     /60 (BP Location: Right arm)   Pulse 72   Ht 162.6 cm (64.02\")   Wt 77.6 kg (171 lb)   BMI 29.34 kg/m²     Vitals and nursing note reviewed.   Constitutional:       General: Not in acute distress.     Appearance: Well-developed. Not diaphoretic.   Eyes:      Pupils: Pupils are equal, round, and reactive to light.   HENT:      Head: Normocephalic.   Pulmonary:      Effort: Pulmonary effort is normal. No respiratory distress.      Breath sounds: Normal breath sounds.   Cardiovascular:      Normal rate. Regular rhythm.      Murmurs: There is a grade 1/6 systolic murmur at the apex.   Pulses:     Intact distal pulses.   Edema:     Peripheral edema absent.   Abdominal:      General: Bowel sounds are normal.      Palpations: Abdomen is soft.   Musculoskeletal: Normal range of motion.      Cervical back: Normal range of motion. Skin:     General: Skin is warm and dry.   Neurological:      Mental Status: Alert and oriented to person, place, and time.        Procedures          Problem List Items Addressed This Visit        Cardiac and Vasculature    Hypertriglyceridemia    Essential hypertension       Endocrine and Metabolic    Metabolic syndrome    Type 2 diabetes mellitus " without complication, without long-term current use of insulin (CMS/[facility])      Other Visit Diagnoses     Dizziness    -  Primary    Relevant Orders    US Carotid Bilateral         1.  Abnormal stress test-Lexiscan stress 7/2020 questionable apical ischemia, managed medically.  Continue Imdur 30 mg daily as she has no chest discomfort.    2.  HTN-very well controlled at 110/60.  Continue lisinopril, metoprolol, triamterene/HCTZ.  Limit sodium.  Weight loss.    3.  Lipids-cholesterol particle numbers are optimal without medication.  Continue heart healthy diet.    4.  Diabetes-continues to be diet controlled with A1C remaining less than 7%.  Labs are followed by Dr. Del Toro.    5.  Dizziness-appears to be BPPV.  With family history of CVA, will check carotid ultrasound to rule out stenosis.      No changes made today.  Cardiac status appears stable.  Refill sent for isosorbide.  Follow-up in 6 months or sooner as needed.    Patient's Body mass index is 29.34 kg/m². indicating that she is overweight (BMI 25-29.9). Obesity-related health conditions include the following: hypertension, coronary heart disease, diabetes mellitus and dyslipidemias. Obesity is unchanged. BMI is is above average; BMI management plan is completed. We discussed portion control and increasing exercise.            Electronically signed by KAMLESH Roberts,  May 27, 2021 08:00 EDT

## 2021-06-14 ENCOUNTER — HOSPITAL ENCOUNTER (OUTPATIENT)
Dept: CARDIOLOGY | Facility: HOSPITAL | Age: 76
Discharge: HOME | End: 2021-06-14
Admitting: NURSE PRACTITIONER

## 2021-06-14 DIAGNOSIS — R42 DIZZINESS: ICD-10-CM

## 2021-06-14 PROCEDURE — 93880 EXTRACRANIAL BILAT STUDY: CPT | Performed by: RADIOLOGY

## 2021-06-14 PROCEDURE — 93880 EXTRACRANIAL BILAT STUDY: CPT

## 2021-08-24 ENCOUNTER — TELEPHONE (OUTPATIENT)
Dept: CARDIOLOGY | Facility: CLINIC | Age: 76
End: 2021-08-24

## 2021-08-24 NOTE — TELEPHONE ENCOUNTER
Patient made aware ER records reviewed, symptoms felt to be musculoskeletal as she was tender upon palpation, with nausea and upper quadrant pain, may need GI work up if symptoms persist, if having chest pain and dyspnea, can repeat cardiac work up. Patient verbalized understanding and reports she does not wish to have cardiac work up at this time due to no chest pain.

## 2021-08-24 NOTE — TELEPHONE ENCOUNTER
Records reviewed.  EKG, troponin, D-dimer negative.    Symptoms felt to be musculoskeletal as she was tender upon palpation?    With nausea and and upper quadrant pain, may need GI work-up if symptoms persist.    If she is having chest pain and dyspnea, we can repeat cardiac work-up.

## 2021-08-24 NOTE — TELEPHONE ENCOUNTER
Patient called to report being in ER at [facility] on Saturday, she was told to notify office.     ER notes obtained and placed on your desk.

## 2021-12-14 ENCOUNTER — OFFICE VISIT (OUTPATIENT)
Dept: CARDIOLOGY | Facility: CLINIC | Age: 76
End: 2021-12-14

## 2021-12-14 VITALS
SYSTOLIC BLOOD PRESSURE: 112 MMHG | DIASTOLIC BLOOD PRESSURE: 62 MMHG | HEART RATE: 72 BPM | HEIGHT: 64 IN | WEIGHT: 169.4 LBS | BODY MASS INDEX: 28.92 KG/M2

## 2021-12-14 DIAGNOSIS — E11.9 TYPE 2 DIABETES MELLITUS WITHOUT COMPLICATION, WITHOUT LONG-TERM CURRENT USE OF INSULIN (HCC): Primary | ICD-10-CM

## 2021-12-14 DIAGNOSIS — R06.02 SHORTNESS OF BREATH: ICD-10-CM

## 2021-12-14 DIAGNOSIS — I10 ESSENTIAL HYPERTENSION: ICD-10-CM

## 2021-12-14 PROCEDURE — 99213 OFFICE O/P EST LOW 20 MIN: CPT | Performed by: NURSE PRACTITIONER

## 2021-12-14 RX ORDER — TRIAMTERENE AND HYDROCHLOROTHIAZIDE 37.5; 25 MG/1; MG/1
1 TABLET ORAL DAILY
Qty: 90 TABLET | Refills: 3 | Status: SHIPPED | OUTPATIENT
Start: 2021-12-14 | End: 2022-07-18 | Stop reason: SDUPTHER

## 2021-12-14 RX ORDER — ISOSORBIDE MONONITRATE 30 MG/1
30 TABLET, EXTENDED RELEASE ORAL DAILY
Qty: 90 TABLET | Refills: 3 | Status: SHIPPED | OUTPATIENT
Start: 2021-12-14 | End: 2022-07-18 | Stop reason: SDUPTHER

## 2021-12-14 RX ORDER — LISINOPRIL 10 MG/1
10 TABLET ORAL DAILY
Qty: 90 TABLET | Refills: 3 | Status: SHIPPED | OUTPATIENT
Start: 2021-12-14 | End: 2022-07-18 | Stop reason: SDUPTHER

## 2021-12-14 RX ORDER — METOPROLOL TARTRATE 50 MG/1
25 TABLET, FILM COATED ORAL 2 TIMES DAILY
Qty: 90 TABLET | Refills: 3 | Status: SHIPPED | OUTPATIENT
Start: 2021-12-14 | End: 2022-07-18 | Stop reason: SDUPTHER

## 2021-12-14 NOTE — PROGRESS NOTES
Chief Complaint   Patient presents with   • Follow-up     Cardiac management   • Lab     Last labs per PCP a month ago.   • Shortness of Breath     Only notices with over exertion.   • Med Refill     Needs refills on cardiac medications-90 day.     Randy Werner is a 76 y.o. female with HTN, diabetes and strong family history of IHD. Stress test in 2011 for chest pain found to have hypertensive response but no ischemia. Beta blockers started. She was diagnosed with (L) breast CA in 2018 and underwent lumpectomy and radiation. She was referred back in June 2020 after she noticed increasing dyspnea on exertion. Cardiac work up repeated with Lexiscan showing normal blood pressure response and mild changes in the apex. LV function normal. Imdur started with plan for cath if symptoms persisted. Lipids 6/2020 well controlled with diet alone, LDL 87, HDL 40, Tri 134, . A1C 6.7%. Carotid US secondary to dizziness and brother passing from acute stroke showed no stenosis.     She returns today for regular follow up. Denies new or worsening cardiac symptoms. She continues to follow at Dr. Turner's office, taking an injection. She completes ADLs without difficulty. Appears she was seen in [facility] ER 8/2021 with upper thoracic back pain and nausea. Troponin, d-dimer, EKG normal. No reoccurrence. Labs showed GFR mildly declined, upper 40's. Labs followed routinely with Dr. Del Toro.         Cardiac History:    Past Surgical History:   Procedure Laterality Date   • CARDIOVASCULAR STRESS TEST  09/13/2011    1 Min. 36 Secs. 4.6 METS. 108% THR. BP- 220/90. EF > 65%. Negative.   • CARDIOVASCULAR STRESS TEST  07/01/2020    Lexiscan- EF 77%. R/O Apical Ischemia.   • ECHO - CONVERTED  09/13/2011    Tachycardic. EF > 60%. Mild MR. RVSP- 29 mmHg.   • ECHO - CONVERTED  07/01/2020    EF 65%. Trace-Mild MR   • US CAROTID UNILATERAL  06/14/2021    No stenosis bilaterally      Current Outpatient Medications   Medication Sig  Dispense Refill   • Ascorbic Acid (VITAMIN C PO) Take  by mouth Daily.     • aspirin 81 MG chewable tablet Chew 81 mg Daily.     • Calcium Carbonate-Vitamin D (CALTRATE 600+D PO) Take 2 tablets by mouth Daily.     • Glucosamine-Chondroitin (Osteo Bi-Flex Regular Strength) 250-200 MG tablet Take 1 tablet by mouth Daily.     • isosorbide mononitrate (IMDUR) 30 MG 24 hr tablet Take 1 tablet by mouth Daily. 90 tablet 3   • letrozole (FEMARA) 2.5 MG tablet Take  by mouth Daily.     • lisinopril (PRINIVIL,ZESTRIL) 10 MG tablet Take 1 tablet by mouth Daily. 90 tablet 3   • MAGNESIUM PO Take 250 mg by mouth 2 (Two) Times a Day.     • metoprolol tartrate (LOPRESSOR) 50 MG tablet Take 0.5 tablets by mouth 2 (Two) Times a Day. 90 tablet 3   • montelukast (SINGULAIR) 10 MG tablet Take 10 mg by mouth Every Night.     • Multiple Vitamins-Minerals (ICAPS) capsule Take 2 tablets by mouth Daily.     • traZODone (DESYREL) 50 MG tablet Take 25 mg by mouth Every Night.     • triamterene-hydrochlorothiazide (MAXZIDE-25) 37.5-25 MG per tablet Take 1 tablet by mouth Daily. Takes 1/2-1 tablet daily 90 tablet 3   • Zinc 50 MG tablet Take 1 tablet by mouth Daily.       No current facility-administered medications for this visit.     Sulfa antibiotics and Latex    Past Medical History:   Diagnosis Date   • Cancer (HCC) 2011    Left breast    • Hypertension      Social History     Socioeconomic History   • Marital status:    Tobacco Use   • Smoking status: Never Smoker   • Smokeless tobacco: Never Used   Vaping Use   • Vaping Use: Never used   Substance and Sexual Activity   • Alcohol use: Not Currently   • Drug use: Never   • Sexual activity: Defer     Family History   Problem Relation Age of Onset   • Lung disease Mother    • Heart attack Father    • Hypertension Sister    • Cancer Sister    • Kidney disease Brother    • Heart disease Brother    • Diabetes Brother      Review of Systems   Constitutional: Positive for weight loss  "(2lb). Negative for decreased appetite and malaise/fatigue.   HENT: Negative.    Eyes: Negative for blurred vision.   Cardiovascular: Positive for dyspnea on exertion (mild). Negative for chest pain, leg swelling, palpitations and syncope.   Respiratory: Negative for shortness of breath and sleep disturbances due to breathing.    Endocrine: Negative.    Hematologic/Lymphatic: Negative for bleeding problem. Does not bruise/bleed easily.   Skin: Negative.    Musculoskeletal: Negative for falls and myalgias.   Gastrointestinal: Negative for abdominal pain, heartburn and melena.   Genitourinary: Negative for hematuria.   Neurological: Negative for dizziness and light-headedness.   Psychiatric/Behavioral: Negative for altered mental status.   Allergic/Immunologic: Negative.       Objective     /62 (BP Location: Right arm)   Pulse 72   Ht 162.6 cm (64.02\")   Wt 76.8 kg (169 lb 6.4 oz)   BMI 29.06 kg/m²     Vitals and nursing note reviewed.   Constitutional:       General: Not in acute distress.     Appearance: Well-developed. Not diaphoretic.   Eyes:      Pupils: Pupils are equal, round, and reactive to light.   HENT:      Head: Normocephalic.   Pulmonary:      Effort: Pulmonary effort is normal. No respiratory distress.      Breath sounds: Normal breath sounds.   Cardiovascular:      Normal rate. Regular rhythm.   Pulses:     Intact distal pulses.   Abdominal:      General: Bowel sounds are normal.      Palpations: Abdomen is soft.   Musculoskeletal: Normal range of motion.      Cervical back: Normal range of motion. Skin:     General: Skin is warm and dry.   Neurological:      Mental Status: Alert and oriented to person, place, and time.        Procedures          Problem List Items Addressed This Visit        Cardiac and Vasculature    Essential hypertension    Relevant Medications    lisinopril (PRINIVIL,ZESTRIL) 10 MG tablet    metoprolol tartrate (LOPRESSOR) 50 MG tablet    triamterene-hydrochlorothiazide " (MAXZIDE-25) 37.5-25 MG per tablet       Endocrine and Metabolic    Type 2 diabetes mellitus without complication, without long-term current use of insulin (HCC) - Primary       Pulmonary and Pneumonias    Shortness of breath         1.  Abnormal stress test-Lexiscan stress 7/2020 questionable apical ischemia, managed medically.  Continue Imdur 30 mg daily as she has no anginal pain.      2.  HTN-very well controlled at 112/62.  Continue lisinopril, metoprolol, triamterene/HCTZ.  Limit sodium.  Weight loss. Increase water intake.      3.  Lipids- LDL/HDL 87/40 without medication.  Continue heart healthy diet.     4.  Diabetes-continues to be diet controlled with A1C remaining less than 7%.  Labs are followed by Dr. Del Toro.     5.  Dizziness-improved, normal carotids.      No changes made today.  Cardiac status appears stable.  Refill sent for isosorbide.  Follow-up in 6 months or sooner as needed.     Patient's Body mass index is 29.06 kg/m². indicating that she is mildly overweight. She has lost 2 lbs. Continues heart healthy diet.              Electronically signed by KAMLESH Roberts,  December 19, 2021 07:59 EST

## 2022-05-03 ENCOUNTER — HOSPITAL ENCOUNTER (OUTPATIENT)
Dept: MRI IMAGING | Facility: HOSPITAL | Age: 77
End: 2022-05-03

## 2022-05-03 ENCOUNTER — APPOINTMENT (OUTPATIENT)
Dept: ONCOLOGY | Facility: HOSPITAL | Age: 77
End: 2022-05-03

## 2022-05-06 ENCOUNTER — HOSPITAL ENCOUNTER (OUTPATIENT)
Dept: MRI IMAGING | Facility: HOSPITAL | Age: 77
Discharge: HOME | End: 2022-05-06
Admitting: RADIOLOGY

## 2022-05-06 DIAGNOSIS — Z85.3 PERSONAL HISTORY OF BREAST CANCER: ICD-10-CM

## 2022-05-06 LAB — CREAT BLDA-MCNC: 0.9 MG/DL (ref 0.6–1.3)

## 2022-05-06 PROCEDURE — 0 GADOBENATE DIMEGLUMINE 529 MG/ML SOLUTION

## 2022-05-06 PROCEDURE — A9577 INJ MULTIHANCE: HCPCS | Performed by: RADIOLOGY

## 2022-05-06 PROCEDURE — C8937 CAD BREAST MRI: HCPCS

## 2022-05-06 PROCEDURE — 0 GADOBENATE DIMEGLUMINE 529 MG/ML SOLUTION: Performed by: RADIOLOGY

## 2022-05-06 PROCEDURE — A9577 INJ MULTIHANCE: HCPCS

## 2022-05-06 PROCEDURE — 77049 MRI BREAST C-+ W/CAD BI: CPT | Performed by: RADIOLOGY

## 2022-05-06 PROCEDURE — 82565 ASSAY OF CREATININE: CPT

## 2022-05-06 PROCEDURE — C8908 MRI W/O FOL W/CONT, BREAST,: HCPCS

## 2022-05-06 RX ADMIN — GADOBENATE DIMEGLUMINE 16 ML: 529 INJECTION, SOLUTION INTRAVENOUS at 15:50

## 2022-07-18 ENCOUNTER — OFFICE VISIT (OUTPATIENT)
Dept: CARDIOLOGY | Facility: CLINIC | Age: 77
End: 2022-07-18

## 2022-07-18 VITALS
WEIGHT: 171.4 LBS | HEIGHT: 64 IN | BODY MASS INDEX: 29.26 KG/M2 | DIASTOLIC BLOOD PRESSURE: 62 MMHG | HEART RATE: 76 BPM | SYSTOLIC BLOOD PRESSURE: 132 MMHG

## 2022-07-18 DIAGNOSIS — R06.02 SHORTNESS OF BREATH: ICD-10-CM

## 2022-07-18 DIAGNOSIS — E78.1 HYPERTRIGLYCERIDEMIA: Primary | ICD-10-CM

## 2022-07-18 DIAGNOSIS — E11.9 TYPE 2 DIABETES MELLITUS WITHOUT COMPLICATION, WITHOUT LONG-TERM CURRENT USE OF INSULIN: ICD-10-CM

## 2022-07-18 DIAGNOSIS — I10 ESSENTIAL HYPERTENSION: ICD-10-CM

## 2022-07-18 PROCEDURE — 99213 OFFICE O/P EST LOW 20 MIN: CPT | Performed by: NURSE PRACTITIONER

## 2022-07-18 RX ORDER — ISOSORBIDE MONONITRATE 30 MG/1
30 TABLET, EXTENDED RELEASE ORAL DAILY
Qty: 90 TABLET | Refills: 3 | Status: SHIPPED | OUTPATIENT
Start: 2022-07-18 | End: 2023-01-30 | Stop reason: SDUPTHER

## 2022-07-18 RX ORDER — METOPROLOL TARTRATE 50 MG/1
25 TABLET, FILM COATED ORAL 2 TIMES DAILY
Qty: 90 TABLET | Refills: 3 | Status: SHIPPED | OUTPATIENT
Start: 2022-07-18 | End: 2023-01-30 | Stop reason: SDUPTHER

## 2022-07-18 RX ORDER — TRIAMTERENE AND HYDROCHLOROTHIAZIDE 37.5; 25 MG/1; MG/1
1 TABLET ORAL DAILY
Qty: 90 TABLET | Refills: 3 | Status: SHIPPED | OUTPATIENT
Start: 2022-07-18 | End: 2023-01-30 | Stop reason: SDUPTHER

## 2022-07-18 RX ORDER — LISINOPRIL 10 MG/1
10 TABLET ORAL DAILY
Qty: 90 TABLET | Refills: 3 | Status: SHIPPED | OUTPATIENT
Start: 2022-07-18 | End: 2023-01-30 | Stop reason: SDUPTHER

## 2022-07-18 NOTE — PROGRESS NOTES
Chief Complaint   Patient presents with   • Follow-up     Cardiac management   • Lab     Has copy of most recent labs.   • Med Refill     Needs refills on cardiac medications-90 day.     Randy Werner is a 77 y.o. female with HTN, diabetes and strong family history of IHD. Stress test in 2011 for chest pain found to have hypertensive response but no ischemia. Beta blockers started. She was diagnosed with (L) breast CA in 2018 and underwent lumpectomy and radiation. She was referred back in June 2020 after she noticed increasing dyspnea on exertion. Cardiac work up repeated with Lexiscan showing normal blood pressure response and mild changes in the apex. LV function normal. Imdur started with plan for cath if symptoms persisted. Lipids 6/2020 well controlled with diet alone, LDL 87, HDL 40, Tri 134, . A1C 6.7%. Carotid US secondary to dizziness and brother passing from acute stroke showed no stenosis.     She returns today for regular follow up.  She is feeling well.  Denies chest pain, shortness of breath, palpitations, dizziness.  Tolerating Imdur.  She continues to follow with Dr. Turner.  CBC and CMP brought in today showed normal H/H 11.8/37.1, platelets 308, glucose 97, BUN 19, CR 0.96, normal electrolytes, normal LFT.  MRI breast May 2022 negative and mammogram June 2022 also reported as negative.         Cardiac History:    Past Surgical History:   Procedure Laterality Date   • CARDIOVASCULAR STRESS TEST  09/13/2011    1 Min. 36 Secs. 4.6 METS. 108% THR. BP- 220/90. EF > 65%. Negative.   • CARDIOVASCULAR STRESS TEST  07/01/2020    Lexiscan- EF 77%. R/O Apical Ischemia.   • ECHO - CONVERTED  09/13/2011    Tachycardic. EF > 60%. Mild MR. RVSP- 29 mmHg.   • ECHO - CONVERTED  07/01/2020    EF 65%. Trace-Mild MR   • US CAROTID UNILATERAL  06/14/2021    No stenosis bilaterally      Current Outpatient Medications   Medication Sig Dispense Refill   • Ascorbic Acid (VITAMIN C PO) Take  by mouth  Daily.     • aspirin 81 MG chewable tablet Chew 81 mg Daily.     • BIOTIN PO Take  by mouth Daily.     • Calcium Carbonate-Vitamin D (CALTRATE 600+D PO) Take 2 tablets by mouth Daily.     • Glucosamine-Chondroitin 250-200 MG tablet Take 1 tablet by mouth Daily.     • isosorbide mononitrate (IMDUR) 30 MG 24 hr tablet Take 1 tablet by mouth Daily. 90 tablet 3   • letrozole (FEMARA) 2.5 MG tablet Take  by mouth Daily.     • lisinopril (PRINIVIL,ZESTRIL) 10 MG tablet Take 1 tablet by mouth Daily. 90 tablet 3   • MAGNESIUM PO Take 500 mg by mouth Daily.     • metoprolol tartrate (LOPRESSOR) 50 MG tablet Take 0.5 tablets by mouth 2 (Two) Times a Day. 90 tablet 3   • montelukast (SINGULAIR) 10 MG tablet Take 10 mg by mouth Every Night.     • Multiple Vitamins-Minerals (ICAPS) capsule Take 2 tablets by mouth Daily.     • traZODone (DESYREL) 50 MG tablet Take 25 mg by mouth Every Night.     • triamterene-hydrochlorothiazide (MAXZIDE-25) 37.5-25 MG per tablet Take 1 tablet by mouth Daily. Takes 1/2-1 tablet daily 90 tablet 3   • Zinc 50 MG tablet Take 1 tablet by mouth Daily.       No current facility-administered medications for this visit.     Sulfa antibiotics and Latex    Past Medical History:   Diagnosis Date   • Cancer (HCC) 2011    Left breast    • Hypertension      Social History     Socioeconomic History   • Marital status:    Tobacco Use   • Smoking status: Never Smoker   • Smokeless tobacco: Never Used   Vaping Use   • Vaping Use: Never used   Substance and Sexual Activity   • Alcohol use: Not Currently   • Drug use: Never   • Sexual activity: Defer     Family History   Problem Relation Age of Onset   • Lung disease Mother    • Heart attack Father    • Hypertension Sister    • Cancer Sister    • Kidney disease Brother    • Heart disease Brother    • Diabetes Brother      Review of Systems   Constitutional: Negative for decreased appetite and malaise/fatigue.   HENT: Negative.    Eyes: Negative for blurred  "vision.   Cardiovascular: Negative for chest pain, dyspnea on exertion, leg swelling, palpitations and syncope.   Respiratory: Negative for shortness of breath and sleep disturbances due to breathing.    Endocrine: Negative.    Hematologic/Lymphatic: Negative for bleeding problem. Does not bruise/bleed easily.   Skin: Negative.    Musculoskeletal: Negative for falls and myalgias.   Gastrointestinal: Negative for abdominal pain, heartburn and melena.   Genitourinary: Negative for hematuria.   Neurological: Negative for dizziness and light-headedness.   Psychiatric/Behavioral: Negative for altered mental status.   Allergic/Immunologic: Negative.       Objective     /62 (BP Location: Right arm)   Pulse 76   Ht 162.6 cm (64.02\")   Wt 77.7 kg (171 lb 6.4 oz)   BMI 29.41 kg/m²     Vitals and nursing note reviewed.   Constitutional:       General: Not in acute distress.     Appearance: Well-developed. Not diaphoretic.   Eyes:      Pupils: Pupils are equal, round, and reactive to light.   HENT:      Head: Normocephalic.   Pulmonary:      Effort: Pulmonary effort is normal. No respiratory distress.      Breath sounds: Normal breath sounds.   Cardiovascular:      Normal rate. Regular rhythm.   Pulses:     Intact distal pulses.   Abdominal:      General: Bowel sounds are normal.      Palpations: Abdomen is soft.   Musculoskeletal: Normal range of motion.      Cervical back: Normal range of motion. Skin:     General: Skin is warm and dry.   Neurological:      Mental Status: Alert and oriented to person, place, and time.        Procedures          Problem List Items Addressed This Visit        Cardiac and Vasculature    Hypertriglyceridemia - Primary    Essential hypertension    Relevant Medications    lisinopril (PRINIVIL,ZESTRIL) 10 MG tablet    metoprolol tartrate (LOPRESSOR) 50 MG tablet    triamterene-hydrochlorothiazide (MAXZIDE-25) 37.5-25 MG per tablet       Endocrine and Metabolic    Type 2 diabetes mellitus " without complication, without long-term current use of insulin (HCC)       Pulmonary and Pneumonias    Shortness of breath         1.  Abnormal stress test-Lexiscan stress 7/2020 questionable apical ischemia, managed medically.  Continue Imdur 30 mg daily as she has no anginal pain.   Plan to repeat stress test in the next 6 months to 1 year for reevaluation.  Sooner if she develops any symptoms.     2.  HTN- well controlled.  Continue lisinopril, metoprolol, triamterene/HCTZ.  Limit sodium.  Weight loss.       3.  Lipids- LDL/HDL 87/40 without medication.  Continue heart healthy diet.     4.  Diabetes-continues to be diet controlled with A1C remaining less than 7%.  Labs are followed by Dr. Del Toro.     5.  Dizziness-improved, normal carotids.      No changes made today.  Cardiac status appears stable.  Refill sent.  Follow-up in 6 months or sooner as needed.    BMI is >= 25 and <30. (Overweight) The following options were offered after discussion;: nutrition counseling/recommendations               Electronically signed by KAMLESH Roberts,  July 21, 2022 10:56 EDT

## 2022-08-09 ENCOUNTER — TELEPHONE (OUTPATIENT)
Dept: CARDIOLOGY | Facility: CLINIC | Age: 77
End: 2022-08-09

## 2022-08-09 NOTE — TELEPHONE ENCOUNTER
Pt contacts office stating she is confused on how to take her medication.  Reports she is currently taking Triam/HCTZ 37.5/25 mg one-half tablet daily.  Her rx reads take 1 tablet daily, takes 1/2-1 tablet daily.  Wants to verify if she is suppose to take a whole tablet every day or 1/2 tablet daily?

## 2022-08-09 NOTE — TELEPHONE ENCOUNTER
Patient made aware to continue 1/2 tablet daily as she is taking. If she is swelling or elevated B/P can take a whole tablet as needed. We can change next prescription to 1/2 tablet if she never needs the whole tablet. Patient verbalized understanding.

## 2022-08-09 NOTE — TELEPHONE ENCOUNTER
Continue 1/2 tablet daily as she is taking.    If she has swelling or elevated blood pressure can take a whole tablet as needed.  Recommend she continue exactly how she is taking now.  We can change next prescription to 1/2 tablet if she never needs the whole tablet.

## 2023-01-25 ENCOUNTER — OFFICE VISIT (OUTPATIENT)
Dept: CARDIOLOGY | Facility: CLINIC | Age: 78
End: 2023-01-25
Payer: MEDICARE

## 2023-01-25 VITALS
BODY MASS INDEX: 28.7 KG/M2 | DIASTOLIC BLOOD PRESSURE: 70 MMHG | HEART RATE: 68 BPM | WEIGHT: 168.1 LBS | SYSTOLIC BLOOD PRESSURE: 138 MMHG | HEIGHT: 64 IN

## 2023-01-25 DIAGNOSIS — I10 ESSENTIAL HYPERTENSION: Primary | ICD-10-CM

## 2023-01-25 DIAGNOSIS — R42 DIZZINESS: ICD-10-CM

## 2023-01-25 DIAGNOSIS — R06.02 SHORTNESS OF BREATH: ICD-10-CM

## 2023-01-25 DIAGNOSIS — E78.1 HYPERTRIGLYCERIDEMIA: ICD-10-CM

## 2023-01-25 DIAGNOSIS — E11.9 TYPE 2 DIABETES MELLITUS WITHOUT COMPLICATION, WITHOUT LONG-TERM CURRENT USE OF INSULIN: ICD-10-CM

## 2023-01-25 DIAGNOSIS — E88.81 METABOLIC SYNDROME: ICD-10-CM

## 2023-01-25 PROCEDURE — 99214 OFFICE O/P EST MOD 30 MIN: CPT | Performed by: NURSE PRACTITIONER

## 2023-01-25 RX ORDER — MELATONIN
1000 DAILY
COMMUNITY

## 2023-01-25 RX ORDER — MULTIPLE VITAMINS W/ MINERALS TAB 9MG-400MCG
1 TAB ORAL DAILY
COMMUNITY

## 2023-01-25 NOTE — PROGRESS NOTES
Chief Complaint   Patient presents with   • Follow-up     Cardiac management     • Lab     Has copy of most recent labs.   • Shortness of Breath     Only notices with over exertion.   • Med Refill     Needs refills on cardiac medications-90 day.     Randy Werner is a 77 y.o. female with HTN, diabetes and strong family history of IHD. Stress test in 2011 for chest pain found to have hypertensive response but no ischemia. Beta blockers started. She was diagnosed with (L) breast CA in 2018 and underwent lumpectomy and radiation. She was referred back in June 2020 after she noticed increasing dyspnea on exertion. Cardiac work up repeated with Lexiscan showing normal blood pressure response and mild changes in the apex. LV function normal. Imdur started with plan for cath if symptoms persisted. Lipids 6/2020 well controlled with diet alone, LDL 87, HDL 40, Tri 134, . A1C 6.7%. Carotid US secondary to dizziness and brother passing from acute stroke showed no stenosis.     She returns today for regular follow up. She denies new or worsening cardiac symptoms. No anginal chest pain. She does note mild dyspnea on exertion. She tolerates Imdur. Continues to follow with Dr. Turner. Labs brought in today showed 8/8/22 A1C 6.5%, normal electrolytes, normal CBC, TSH 2.82. , , LDL 96, HDL 44.  MRI breast May 2022 negative and mammogram June 2022 also reported as negative.       Cardiac History:    Past Surgical History:   Procedure Laterality Date   • CARDIOVASCULAR STRESS TEST  09/13/2011    1 Min. 36 Secs. 4.6 METS. 108% THR. BP- 220/90. EF > 65%. Negative.   • CARDIOVASCULAR STRESS TEST  07/01/2020    Lexiscan- EF 77%. R/O Apical Ischemia.   • ECHO - CONVERTED  09/13/2011    Tachycardic. EF > 60%. Mild MR. RVSP- 29 mmHg.   • ECHO - CONVERTED  07/01/2020    EF 65%. Trace-Mild MR   • US CAROTID UNILATERAL  06/14/2021    No stenosis bilaterally      Current Outpatient Medications   Medication Sig  Dispense Refill   • Ascorbic Acid (VITAMIN C PO) Take 1,000 mg by mouth Daily.     • aspirin 81 MG chewable tablet Chew 81 mg Daily.     • Calcium Carbonate-Vitamin D (CALTRATE 600+D PO) Take 2 tablets by mouth Daily.     • cholecalciferol (Vitamin D, Cholecalciferol,) 25 MCG (1000 UT) tablet Take 1,000 Units by mouth Daily.     • Glucosamine-Chondroitin 250-200 MG tablet Take 1 tablet by mouth Daily.     • isosorbide mononitrate (IMDUR) 30 MG 24 hr tablet Take 1 tablet by mouth Daily. 90 tablet 3   • letrozole (FEMARA) 2.5 MG tablet Take  by mouth Daily.     • lisinopril (PRINIVIL,ZESTRIL) 10 MG tablet Take 1 tablet by mouth Daily. 90 tablet 3   • MAGNESIUM PO Take 500 mg by mouth Daily.     • metoprolol tartrate (LOPRESSOR) 50 MG tablet Take 0.5 tablets by mouth 2 (Two) Times a Day. 90 tablet 3   • montelukast (SINGULAIR) 10 MG tablet Take 10 mg by mouth Every Night.     • Multiple Vitamins-Minerals (ICAPS) capsule Take 2 tablets by mouth Daily.     • multivitamin with minerals (CENTRUM SILVER PO) Take 1 tablet by mouth Daily.     • triamterene-hydrochlorothiazide (MAXZIDE-25) 37.5-25 MG per tablet Take 1 tablet by mouth Daily. Takes 1/2-1 tablet daily (Patient taking differently: Takes 1/2-1 tablet daily) 90 tablet 3   • Zinc 50 MG tablet Take 1 tablet by mouth Daily.     • BIOTIN PO Take  by mouth Daily.     • traZODone (DESYREL) 50 MG tablet Take 25 mg by mouth Every Night.       No current facility-administered medications for this visit.     Sulfa antibiotics and Latex    Past Medical History:   Diagnosis Date   • Cancer (HCC) 2011    Left breast    • Hypertension      Social History     Socioeconomic History   • Marital status:    Tobacco Use   • Smoking status: Never   • Smokeless tobacco: Never   Vaping Use   • Vaping Use: Never used   Substance and Sexual Activity   • Alcohol use: Not Currently   • Drug use: Never   • Sexual activity: Defer     Family History   Problem Relation Age of Onset   •  "Lung disease Mother    • Heart attack Father    • Hypertension Sister    • Cancer Sister    • Kidney disease Brother    • Heart disease Brother    • Diabetes Brother      Review of Systems   Constitutional: Positive for weight loss (-3). Negative for decreased appetite and malaise/fatigue.   HENT: Negative.    Eyes: Negative for blurred vision.   Cardiovascular: Positive for dyspnea on exertion. Negative for chest pain, leg swelling, palpitations and syncope.   Respiratory: Negative for shortness of breath and sleep disturbances due to breathing.    Endocrine: Negative.    Hematologic/Lymphatic: Negative for bleeding problem. Does not bruise/bleed easily.   Skin: Negative.    Musculoskeletal: Negative for falls and myalgias.   Gastrointestinal: Negative for abdominal pain, heartburn and melena.   Genitourinary: Negative for hematuria.   Neurological: Negative for dizziness and light-headedness.   Psychiatric/Behavioral: Negative for altered mental status.   Allergic/Immunologic: Negative.       Objective     /70 (BP Location: Right arm)   Pulse 68   Ht 162.6 cm (64.02\")   Wt 76.2 kg (168 lb 1.6 oz)   BMI 28.84 kg/m²     Vitals and nursing note reviewed.   Constitutional:       General: Not in acute distress.     Appearance: Well-developed. Not diaphoretic.   Eyes:      Pupils: Pupils are equal, round, and reactive to light.   HENT:      Head: Normocephalic.   Pulmonary:      Effort: Pulmonary effort is normal. No respiratory distress.      Breath sounds: Normal breath sounds.   Cardiovascular:      Normal rate. Regular rhythm.      Murmurs: There is a grade 1/6 systolic murmur.   Pulses:     Intact distal pulses.   Edema:     Peripheral edema absent.   Abdominal:      General: Bowel sounds are normal.      Palpations: Abdomen is soft.   Musculoskeletal: Normal range of motion.      Cervical back: Normal range of motion. Skin:     General: Skin is warm and dry.   Neurological:      Mental Status: Alert and " oriented to person, place, and time.        Procedures          Problem List Items Addressed This Visit        Cardiac and Vasculature    Hypertriglyceridemia    Relevant Orders    Stress Test With Myocardial Perfusion One Day    Adult Transthoracic Echo Complete W/ Cont if Necessary Per Protocol    Essential hypertension - Primary    Relevant Orders    Stress Test With Myocardial Perfusion One Day    Adult Transthoracic Echo Complete W/ Cont if Necessary Per Protocol       Endocrine and Metabolic    Metabolic syndrome    Relevant Orders    Stress Test With Myocardial Perfusion One Day    Adult Transthoracic Echo Complete W/ Cont if Necessary Per Protocol    Type 2 diabetes mellitus without complication, without long-term current use of insulin (HCC)    Relevant Orders    Stress Test With Myocardial Perfusion One Day    Adult Transthoracic Echo Complete W/ Cont if Necessary Per Protocol       Pulmonary and Pneumonias    Shortness of breath    Relevant Orders    Stress Test With Myocardial Perfusion One Day    Adult Transthoracic Echo Complete W/ Cont if Necessary Per Protocol   Other Visit Diagnoses     Dizziness        Relevant Orders    Stress Test With Myocardial Perfusion One Day    Adult Transthoracic Echo Complete W/ Cont if Necessary Per Protocol         1.  Abnormal stress test-Lexiscan stress 7/2020 questionable apical ischemia, managed medically.  Continue Imdur 30 mg daily as she has no anginal pain. Recommend repeat stress test for reevaluation as she does appear to have diabetes. Continue Imdur. Continue asp.      2.  HTN- well controlled.  Continue lisinopril, metoprolol, triamterene/HCTZ.  Limit sodium.  Weight loss.       3.  Lipids- LDL/HDL 98/44 without medication.  Continue heart healthy diet. May need to consider adding statin if stress shows worsening ischemia.      4.  Diabetes-continues to be diet controlled with A1C 6.5% on recent report. Labs are followed by Dr. Del Toro.     5.   Dizziness-improved, normal carotids.      No changes made today.  Thank you for sending labs.   Further recommendations to follow stress and echo. Refill sent.      Follow-up in 6 months or sooner as needed.    BMI is >= 25 and <30. (Overweight) The following options were offered after discussion;: nutrition counseling/recommendations            Electronically signed by KAMLESH Roberts,  January 30, 2023 06:50 EST

## 2023-01-25 NOTE — LETTER
January 30, 2023     Tai Del Toro MD  [address] [place] [id number]    Patient: Khushboo Werner   YOB: 1945   Date of Visit: 1/25/2023       Dear Tai Del Toro MD    Khushboo Werner was in my office today. Below is a copy of my note.    If you have questions, please do not hesitate to call me. I look forward to following Khushboo along with you.         Sincerely,        KAMLESH Roberts        CC: No Recipients    Chief Complaint   Patient presents with   • Follow-up     Cardiac management     • Lab     Has copy of most recent labs.   • Shortness of Breath     Only notices with over exertion.   • Med Refill     Needs refills on cardiac medications-90 day.     Subjective       Khushboo Werner is a 77 y.o. female with HTN, diabetes and strong family history of IHD. Stress test in 2011 for chest pain found to have hypertensive response but no ischemia. Beta blockers started. She was diagnosed with (L) breast CA in 2018 and underwent lumpectomy and radiation. She was referred back in June 2020 after she noticed increasing dyspnea on exertion. Cardiac work up repeated with Lexiscan showing normal blood pressure response and mild changes in the apex. LV function normal. Imdur started with plan for cath if symptoms persisted. Lipids 6/2020 well controlled with diet alone, LDL 87, HDL 40, Tri 134, . A1C 6.7%. Carotid US secondary to dizziness and brother passing from acute stroke showed no stenosis.     She returns today for regular follow up. She denies new or worsening cardiac symptoms. No anginal chest pain. She does note mild dyspnea on exertion. She tolerates Imdur. Continues to follow with Dr. Turner. Labs brought in today showed 8/8/22 A1C 6.5%, normal electrolytes, normal CBC, TSH 2.82. , , LDL 96, HDL 44.  MRI breast May 2022 negative and mammogram June 2022 also reported as negative.      Cardiac History:    Past Surgical History:   Procedure Laterality Date   • CARDIOVASCULAR  STRESS TEST  09/13/2011    1 Min. 36 Secs. 4.6 METS. 108% THR. BP- 220/90. EF > 65%. Negative.   • CARDIOVASCULAR STRESS TEST  07/01/2020    Lexiscan- EF 77%. R/O Apical Ischemia.   • ECHO - CONVERTED  09/13/2011    Tachycardic. EF > 60%. Mild MR. RVSP- 29 mmHg.   • ECHO - CONVERTED  07/01/2020    EF 65%. Trace-Mild MR   • US CAROTID UNILATERAL  06/14/2021    No stenosis bilaterally      Current Outpatient Medications   Medication Sig Dispense Refill   • Ascorbic Acid (VITAMIN C PO) Take 1,000 mg by mouth Daily.     • aspirin 81 MG chewable tablet Chew 81 mg Daily.     • Calcium Carbonate-Vitamin D (CALTRATE 600+D PO) Take 2 tablets by mouth Daily.     • cholecalciferol (Vitamin D, Cholecalciferol,) 25 MCG (1000 UT) tablet Take 1,000 Units by mouth Daily.     • Glucosamine-Chondroitin 250-200 MG tablet Take 1 tablet by mouth Daily.     • isosorbide mononitrate (IMDUR) 30 MG 24 hr tablet Take 1 tablet by mouth Daily. 90 tablet 3   • letrozole (FEMARA) 2.5 MG tablet Take  by mouth Daily.     • lisinopril (PRINIVIL,ZESTRIL) 10 MG tablet Take 1 tablet by mouth Daily. 90 tablet 3   • MAGNESIUM PO Take 500 mg by mouth Daily.     • metoprolol tartrate (LOPRESSOR) 50 MG tablet Take 0.5 tablets by mouth 2 (Two) Times a Day. 90 tablet 3   • montelukast (SINGULAIR) 10 MG tablet Take 10 mg by mouth Every Night.     • Multiple Vitamins-Minerals (ICAPS) capsule Take 2 tablets by mouth Daily.     • multivitamin with minerals (CENTRUM SILVER PO) Take 1 tablet by mouth Daily.     • triamterene-hydrochlorothiazide (MAXZIDE-25) 37.5-25 MG per tablet Take 1 tablet by mouth Daily. Takes 1/2-1 tablet daily (Patient taking differently: Takes 1/2-1 tablet daily) 90 tablet 3   • Zinc 50 MG tablet Take 1 tablet by mouth Daily.     • BIOTIN PO Take  by mouth Daily.     • traZODone (DESYREL) 50 MG tablet Take 25 mg by mouth Every Night.       No current facility-administered medications for this visit.     Sulfa antibiotics and Latex    Past  "Medical History:   Diagnosis Date   • Cancer (HCC) 2011    Left breast    • Hypertension      Social History     Socioeconomic History   • Marital status:    Tobacco Use   • Smoking status: Never   • Smokeless tobacco: Never   Vaping Use   • Vaping Use: Never used   Substance and Sexual Activity   • Alcohol use: Not Currently   • Drug use: Never   • Sexual activity: Defer     Family History   Problem Relation Age of Onset   • Lung disease Mother    • Heart attack Father    • Hypertension Sister    • Cancer Sister    • Kidney disease Brother    • Heart disease Brother    • Diabetes Brother      Review of Systems   Constitutional: Positive for weight loss (-3). Negative for decreased appetite and malaise/fatigue.   HENT: Negative.    Eyes: Negative for blurred vision.   Cardiovascular: Positive for dyspnea on exertion. Negative for chest pain, leg swelling, palpitations and syncope.   Respiratory: Negative for shortness of breath and sleep disturbances due to breathing.    Endocrine: Negative.    Hematologic/Lymphatic: Negative for bleeding problem. Does not bruise/bleed easily.   Skin: Negative.    Musculoskeletal: Negative for falls and myalgias.   Gastrointestinal: Negative for abdominal pain, heartburn and melena.   Genitourinary: Negative for hematuria.   Neurological: Negative for dizziness and light-headedness.   Psychiatric/Behavioral: Negative for altered mental status.   Allergic/Immunologic: Negative.       Objective      /70 (BP Location: Right arm)   Pulse 68   Ht 162.6 cm (64.02\")   Wt 76.2 kg (168 lb 1.6 oz)   BMI 28.84 kg/m²     Vitals and nursing note reviewed.   Constitutional:       General: Not in acute distress.     Appearance: Well-developed. Not diaphoretic.   Eyes:      Pupils: Pupils are equal, round, and reactive to light.   HENT:      Head: Normocephalic.   Pulmonary:      Effort: Pulmonary effort is normal. No respiratory distress.      Breath sounds: Normal breath sounds. "   Cardiovascular:      Normal rate. Regular rhythm.      Murmurs: There is a grade 1/6 systolic murmur.   Pulses:     Intact distal pulses.   Edema:     Peripheral edema absent.   Abdominal:      General: Bowel sounds are normal.      Palpations: Abdomen is soft.   Musculoskeletal: Normal range of motion.      Cervical back: Normal range of motion. Skin:     General: Skin is warm and dry.   Neurological:      Mental Status: Alert and oriented to person, place, and time.        Procedures         Problem List Items Addressed This Visit        Cardiac and Vasculature    Hypertriglyceridemia    Relevant Orders    Stress Test With Myocardial Perfusion One Day    Adult Transthoracic Echo Complete W/ Cont if Necessary Per Protocol    Essential hypertension - Primary    Relevant Orders    Stress Test With Myocardial Perfusion One Day    Adult Transthoracic Echo Complete W/ Cont if Necessary Per Protocol       Endocrine and Metabolic    Metabolic syndrome    Relevant Orders    Stress Test With Myocardial Perfusion One Day    Adult Transthoracic Echo Complete W/ Cont if Necessary Per Protocol    Type 2 diabetes mellitus without complication, without long-term current use of insulin (HCC)    Relevant Orders    Stress Test With Myocardial Perfusion One Day    Adult Transthoracic Echo Complete W/ Cont if Necessary Per Protocol       Pulmonary and Pneumonias    Shortness of breath    Relevant Orders    Stress Test With Myocardial Perfusion One Day    Adult Transthoracic Echo Complete W/ Cont if Necessary Per Protocol   Other Visit Diagnoses     Dizziness        Relevant Orders    Stress Test With Myocardial Perfusion One Day    Adult Transthoracic Echo Complete W/ Cont if Necessary Per Protocol         1.  Abnormal stress test-Lexiscan stress 7/2020 questionable apical ischemia, managed medically.  Continue Imdur 30 mg daily as she has no anginal pain. Recommend repeat stress test for reevaluation as she does appear to have  diabetes. Continue Imdur. Continue asp.      2.  HTN- well controlled.  Continue lisinopril, metoprolol, triamterene/HCTZ.  Limit sodium.  Weight loss.       3.  Lipids- LDL/HDL 98/44 without medication.  Continue heart healthy diet. May need to consider adding statin if stress shows worsening ischemia.      4.  Diabetes-continues to be diet controlled with A1C 6.5% on recent report. Labs are followed by Dr. Del Toro.     5.  Dizziness-improved, normal carotids.      No changes made today.  Thank you for sending labs.   Further recommendations to follow stress and echo. Refill sent.      Follow-up in 6 months or sooner as needed.    BMI is >= 25 and <30. (Overweight) The following options were offered after discussion;: nutrition counseling/recommendations           Electronically signed by KAMLESH Roberts,  January 30, 2023 06:50 EST

## 2023-01-30 RX ORDER — ISOSORBIDE MONONITRATE 30 MG/1
30 TABLET, EXTENDED RELEASE ORAL DAILY
Qty: 90 TABLET | Refills: 3 | Status: SHIPPED | OUTPATIENT
Start: 2023-01-30

## 2023-01-30 RX ORDER — LISINOPRIL 10 MG/1
10 TABLET ORAL DAILY
Qty: 90 TABLET | Refills: 3 | Status: SHIPPED | OUTPATIENT
Start: 2023-01-30

## 2023-01-30 RX ORDER — METOPROLOL TARTRATE 50 MG/1
25 TABLET, FILM COATED ORAL 2 TIMES DAILY
Qty: 90 TABLET | Refills: 3 | Status: SHIPPED | OUTPATIENT
Start: 2023-01-30

## 2023-01-30 RX ORDER — TRIAMTERENE AND HYDROCHLOROTHIAZIDE 37.5; 25 MG/1; MG/1
TABLET ORAL
Qty: 90 TABLET | Refills: 3 | Status: SHIPPED | OUTPATIENT
Start: 2023-01-30

## 2023-02-07 ENCOUNTER — LAB (OUTPATIENT)
Dept: LAB | Facility: HOSPITAL | Age: 78
End: 2023-02-07
Payer: MEDICARE

## 2023-02-07 ENCOUNTER — HOSPITAL ENCOUNTER (OUTPATIENT)
Dept: CARDIOLOGY | Facility: HOSPITAL | Age: 78
Discharge: HOME | End: 2023-02-07
Payer: MEDICARE

## 2023-02-07 ENCOUNTER — TRANSCRIBE ORDERS (OUTPATIENT)
Dept: LAB | Facility: HOSPITAL | Age: 78
End: 2023-02-07
Payer: MEDICARE

## 2023-02-07 VITALS — WEIGHT: 167.99 LBS | HEIGHT: 64 IN | BODY MASS INDEX: 28.68 KG/M2

## 2023-02-07 DIAGNOSIS — I10 ESSENTIAL HYPERTENSION: ICD-10-CM

## 2023-02-07 DIAGNOSIS — R06.02 SHORTNESS OF BREATH: ICD-10-CM

## 2023-02-07 DIAGNOSIS — R42 DIZZINESS: ICD-10-CM

## 2023-02-07 DIAGNOSIS — E11.9 TYPE 2 DIABETES MELLITUS WITHOUT COMPLICATION, WITHOUT LONG-TERM CURRENT USE OF INSULIN: ICD-10-CM

## 2023-02-07 DIAGNOSIS — E78.1 HYPERTRIGLYCERIDEMIA: ICD-10-CM

## 2023-02-07 DIAGNOSIS — E88.81 METABOLIC SYNDROME: ICD-10-CM

## 2023-02-07 DIAGNOSIS — E11.9 DIABETES MELLITUS WITHOUT COMPLICATION: ICD-10-CM

## 2023-02-07 DIAGNOSIS — E11.9 DIABETES MELLITUS WITHOUT COMPLICATION: Primary | ICD-10-CM

## 2023-02-07 LAB
ALBUMIN SERPL-MCNC: 4.6 G/DL (ref 3.5–5.2)
ALBUMIN/GLOB SERPL: 1.5 G/DL
ALP SERPL-CCNC: 92 U/L (ref 39–117)
ALT SERPL W P-5'-P-CCNC: 21 U/L (ref 1–33)
ANION GAP SERPL CALCULATED.3IONS-SCNC: 15.3 MMOL/L (ref 5–15)
AORTIC DIMENSIONLESS INDEX: 0.83 (DI)
AST SERPL-CCNC: 25 U/L (ref 1–32)
BH CV ECHO MEAS - ACS: 1.79 CM
BH CV ECHO MEAS - AO MAX PG: 4.2 MMHG
BH CV ECHO MEAS - AO MEAN PG: 2.27 MMHG
BH CV ECHO MEAS - AO ROOT DIAM: 2.9 CM
BH CV ECHO MEAS - AO V2 MAX: 102.4 CM/SEC
BH CV ECHO MEAS - AO V2 VTI: 23.7 CM
BH CV ECHO MEAS - EDV(CUBED): 54.5 ML
BH CV ECHO MEAS - EF_3D-VOL: 61 %
BH CV ECHO MEAS - ESV(CUBED): 20.1 ML
BH CV ECHO MEAS - FS: 28.2 %
BH CV ECHO MEAS - IVS/LVPW: 0.93 CM
BH CV ECHO MEAS - IVSD: 0.81 CM
BH CV ECHO MEAS - LA DIMENSION: 3.3 CM
BH CV ECHO MEAS - LAT PEAK E' VEL: 6.2 CM/SEC
BH CV ECHO MEAS - LV MASS(C)D: 91.9 GRAMS
BH CV ECHO MEAS - LV MAX PG: 3 MMHG
BH CV ECHO MEAS - LV MEAN PG: 1.43 MMHG
BH CV ECHO MEAS - LV V1 MAX: 86 CM/SEC
BH CV ECHO MEAS - LV V1 VTI: 19.3 CM
BH CV ECHO MEAS - LVIDD: 3.8 CM
BH CV ECHO MEAS - LVIDS: 2.7 CM
BH CV ECHO MEAS - LVPWD: 0.87 CM
BH CV ECHO MEAS - MED PEAK E' VEL: 4.5 CM/SEC
BH CV ECHO MEAS - MV A MAX VEL: 91.5 CM/SEC
BH CV ECHO MEAS - MV DEC SLOPE: 301.4 CM/SEC2
BH CV ECHO MEAS - MV DEC TIME: 0.26 MSEC
BH CV ECHO MEAS - MV E MAX VEL: 58.5 CM/SEC
BH CV ECHO MEAS - MV E/A: 0.64
BH CV ECHO MEAS - MV MAX PG: 4 MMHG
BH CV ECHO MEAS - MV MEAN PG: 1.28 MMHG
BH CV ECHO MEAS - MV P1/2T: 66.7 MSEC
BH CV ECHO MEAS - MV V2 VTI: 28.7 CM
BH CV ECHO MEAS - MVA(P1/2T): 3.3 CM2
BH CV ECHO MEAS - PA V2 MAX: 89.8 CM/SEC
BH CV ECHO MEAS - RAP SYSTOLE: 8 MMHG
BH CV ECHO MEAS - RV MAX PG: 1.69 MMHG
BH CV ECHO MEAS - RV V1 MAX: 64.9 CM/SEC
BH CV ECHO MEAS - RV V1 VTI: 15.8 CM
BH CV ECHO MEAS - RVDD: 2.5 CM
BH CV ECHO MEAS - RVSP: 14.7 MMHG
BH CV ECHO MEAS - TAPSE (>1.6): 1.96 CM
BH CV ECHO MEAS - TR MAX PG: 6.7 MMHG
BH CV ECHO MEAS - TR MAX VEL: 129.4 CM/SEC
BH CV ECHO MEASUREMENTS AVERAGE E/E' RATIO: 10.93
BH CV REST NUCLEAR ISOTOPE DOSE: 10 MCI
BH CV STRESS COMMENTS STAGE 1: NORMAL
BH CV STRESS DOSE REGADENOSON STAGE 1: 0.4
BH CV STRESS DURATION MIN STAGE 1: 0
BH CV STRESS DURATION SEC STAGE 1: 10
BH CV STRESS NUCLEAR ISOTOPE DOSE: 30 MCI
BH CV STRESS PROTOCOL 1: NORMAL
BH CV STRESS RECOVERY BP: NORMAL MMHG
BH CV STRESS RECOVERY HR: 91 BPM
BH CV STRESS STAGE 1: 1
BH CV XLRA - TDI S': 12.6 CM/SEC
BILIRUB SERPL-MCNC: 0.5 MG/DL (ref 0–1.2)
BUN SERPL-MCNC: 16 MG/DL (ref 8–23)
BUN/CREAT SERPL: 14.7 (ref 7–25)
CALCIUM SPEC-SCNC: 10.3 MG/DL (ref 8.6–10.5)
CHLORIDE SERPL-SCNC: 101 MMOL/L (ref 98–107)
CHOLEST SERPL-MCNC: 179 MG/DL (ref 0–200)
CO2 SERPL-SCNC: 24.7 MMOL/L (ref 22–29)
CREAT SERPL-MCNC: 1.09 MG/DL (ref 0.57–1)
EGFRCR SERPLBLD CKD-EPI 2021: 53.1 ML/MIN/1.73
GLOBULIN UR ELPH-MCNC: 3 GM/DL
GLUCOSE SERPL-MCNC: 144 MG/DL (ref 65–99)
HBA1C MFR BLD: 6.7 % (ref 4.8–5.6)
HDLC SERPL-MCNC: 50 MG/DL (ref 40–60)
LDLC SERPL CALC-MCNC: 98 MG/DL (ref 0–100)
LDLC/HDLC SERPL: 1.85 {RATIO}
LV EF 2D ECHO EST: 55 %
LV EF NUC BP: 79 %
MAXIMAL PREDICTED HEART RATE: 145 BPM
MAXIMAL PREDICTED HEART RATE: 145 BPM
PERCENT MAX PREDICTED HR: 79.31 %
POTASSIUM SERPL-SCNC: 5.1 MMOL/L (ref 3.5–5.2)
PROT SERPL-MCNC: 7.6 G/DL (ref 6–8.5)
SINUS: 2.7 CM
SODIUM SERPL-SCNC: 141 MMOL/L (ref 136–145)
STRESS BASELINE BP: NORMAL MMHG
STRESS BASELINE HR: 64 BPM
STRESS PERCENT HR: 93 %
STRESS POST PEAK BP: NORMAL MMHG
STRESS POST PEAK HR: 115 BPM
STRESS TARGET HR: 123 BPM
STRESS TARGET HR: 123 BPM
TRIGL SERPL-MCNC: 183 MG/DL (ref 0–150)
VLDLC SERPL-MCNC: 31 MG/DL (ref 5–40)

## 2023-02-07 PROCEDURE — 0 TECHNETIUM SESTAMIBI: Performed by: INTERNAL MEDICINE

## 2023-02-07 PROCEDURE — 80053 COMPREHEN METABOLIC PANEL: CPT | Performed by: INTERNAL MEDICINE

## 2023-02-07 PROCEDURE — 93017 CV STRESS TEST TRACING ONLY: CPT

## 2023-02-07 PROCEDURE — 78452 HT MUSCLE IMAGE SPECT MULT: CPT

## 2023-02-07 PROCEDURE — 93306 TTE W/DOPPLER COMPLETE: CPT

## 2023-02-07 PROCEDURE — 80061 LIPID PANEL: CPT | Performed by: INTERNAL MEDICINE

## 2023-02-07 PROCEDURE — 93306 TTE W/DOPPLER COMPLETE: CPT | Performed by: INTERNAL MEDICINE

## 2023-02-07 PROCEDURE — 78452 HT MUSCLE IMAGE SPECT MULT: CPT | Performed by: INTERNAL MEDICINE

## 2023-02-07 PROCEDURE — 25010000002 REGADENOSON 0.4 MG/5ML SOLUTION: Performed by: INTERNAL MEDICINE

## 2023-02-07 PROCEDURE — 93018 CV STRESS TEST I&R ONLY: CPT | Performed by: INTERNAL MEDICINE

## 2023-02-07 PROCEDURE — 83036 HEMOGLOBIN GLYCOSYLATED A1C: CPT | Performed by: INTERNAL MEDICINE

## 2023-02-07 PROCEDURE — A9500 TC99M SESTAMIBI: HCPCS | Performed by: INTERNAL MEDICINE

## 2023-02-07 RX ADMIN — TECHNETIUM TC 99M SESTAMIBI 1 DOSE: 1 INJECTION INTRAVENOUS at 11:25

## 2023-02-07 RX ADMIN — TECHNETIUM TC 99M SESTAMIBI 1 DOSE: 1 INJECTION INTRAVENOUS at 08:41

## 2023-02-07 RX ADMIN — REGADENOSON 0.4 MG: 0.08 INJECTION, SOLUTION INTRAVENOUS at 11:25

## 2023-05-30 ENCOUNTER — TELEPHONE (OUTPATIENT)
Dept: CARDIOLOGY | Facility: CLINIC | Age: 78
End: 2023-05-30

## 2023-05-30 DIAGNOSIS — I10 ESSENTIAL HYPERTENSION: ICD-10-CM

## 2023-05-30 RX ORDER — LISINOPRIL 10 MG/1
15 TABLET ORAL DAILY
Qty: 135 TABLET | Refills: 3 | Status: SHIPPED | OUTPATIENT
Start: 2023-05-30

## 2023-05-30 NOTE — TELEPHONE ENCOUNTER
Patient was to increase lisinopril to 15 mg daily. Script for lisinopril 10 mg take 1.5 tablets daily is pended to be sent to [facility]. 135 tablets and 3 refills.

## 2023-05-30 NOTE — TELEPHONE ENCOUNTER
Caller: Khushboo Werner    Relationship: Self    Best call back number: [phone]    What is the best time to reach you: ANY    What was the call regarding: LISINOPRIL WAS SUPPOSE TO BE 1-1/5 PILLS AND THE PRESCRIPTION IS NOT UPDATED AT THE PHARMACY.    Is it okay if the provider responds through MyChart: PLEASE CALL

## 2023-08-03 ENCOUNTER — OFFICE VISIT (OUTPATIENT)
Dept: CARDIOLOGY | Facility: CLINIC | Age: 78
End: 2023-08-03
Payer: MEDICARE

## 2023-08-03 VITALS
SYSTOLIC BLOOD PRESSURE: 124 MMHG | HEART RATE: 80 BPM | HEIGHT: 64 IN | WEIGHT: 169.8 LBS | BODY MASS INDEX: 28.99 KG/M2 | DIASTOLIC BLOOD PRESSURE: 80 MMHG

## 2023-08-03 DIAGNOSIS — E78.1 HYPERTRIGLYCERIDEMIA: Primary | ICD-10-CM

## 2023-08-03 DIAGNOSIS — E11.9 TYPE 2 DIABETES MELLITUS WITHOUT COMPLICATION, WITHOUT LONG-TERM CURRENT USE OF INSULIN: ICD-10-CM

## 2023-08-03 DIAGNOSIS — R06.02 SHORTNESS OF BREATH: ICD-10-CM

## 2023-08-03 RX ORDER — LOSARTAN POTASSIUM 25 MG/1
25 TABLET ORAL DAILY
COMMUNITY
End: 2023-08-05 | Stop reason: SDUPTHER

## 2023-08-05 RX ORDER — TRIAMTERENE AND HYDROCHLOROTHIAZIDE 37.5; 25 MG/1; MG/1
TABLET ORAL
Qty: 90 TABLET | Refills: 3 | Status: SHIPPED | OUTPATIENT
Start: 2023-08-05

## 2023-08-05 RX ORDER — ISOSORBIDE MONONITRATE 30 MG/1
30 TABLET, EXTENDED RELEASE ORAL DAILY
Qty: 90 TABLET | Refills: 3 | Status: SHIPPED | OUTPATIENT
Start: 2023-08-05

## 2023-08-05 RX ORDER — METOPROLOL TARTRATE 50 MG/1
25 TABLET, FILM COATED ORAL 2 TIMES DAILY
Qty: 90 TABLET | Refills: 3 | Status: SHIPPED | OUTPATIENT
Start: 2023-08-05

## 2023-08-05 RX ORDER — LOSARTAN POTASSIUM 25 MG/1
25 TABLET ORAL DAILY
Qty: 90 TABLET | Refills: 3 | Status: SHIPPED | OUTPATIENT
Start: 2023-08-05

## 2024-02-21 ENCOUNTER — OFFICE VISIT (OUTPATIENT)
Dept: CARDIOLOGY | Facility: CLINIC | Age: 79
End: 2024-02-21
Payer: MEDICARE

## 2024-02-21 VITALS
HEART RATE: 64 BPM | HEIGHT: 64 IN | WEIGHT: 166.8 LBS | BODY MASS INDEX: 28.48 KG/M2 | SYSTOLIC BLOOD PRESSURE: 124 MMHG | DIASTOLIC BLOOD PRESSURE: 70 MMHG

## 2024-02-21 DIAGNOSIS — R73.9 HYPERGLYCEMIA: ICD-10-CM

## 2024-02-21 DIAGNOSIS — R06.02 SHORTNESS OF BREATH: ICD-10-CM

## 2024-02-21 DIAGNOSIS — E88.810 METABOLIC SYNDROME: ICD-10-CM

## 2024-02-21 DIAGNOSIS — I10 ESSENTIAL HYPERTENSION: Primary | ICD-10-CM

## 2024-02-21 DIAGNOSIS — E78.1 HYPERTRIGLYCERIDEMIA: ICD-10-CM

## 2024-02-21 DIAGNOSIS — E11.9 TYPE 2 DIABETES MELLITUS WITHOUT COMPLICATION, WITHOUT LONG-TERM CURRENT USE OF INSULIN: ICD-10-CM

## 2024-02-21 NOTE — PROGRESS NOTES
Chief Complaint   Patient presents with    Follow-up     Cardiac management    Lab     Last labs in chart.     Med Refill     Does not need refills at this time.     Randy Werner is a 79 y.o. female with HTN, diabetes and strong family history of IHD. Stress test in 2011 for chest pain found to have hypertensive response but no ischemia. Beta blockers started. She was diagnosed with (L) breast CA in 2018 and underwent lumpectomy and radiation. She was referred back in June 2020 after she noticed increasing dyspnea on exertion. Cardiac work up repeated with Lexiscan showing normal blood pressure response and mild changes in the apex. LV function normal. Imdur started with plan for cath if symptoms persisted. Carotid US secondary to dizziness and brother passing from acute stroke showed no stenosis. Repeat cardiac work up 2/2023 showed no ischemia. She was admitted to [facility] with SOB, COPD exacerbation on 7/9/23. CXR showed emphysema, negative d dimer, BNP. Treated with steroids and antibiotics. Lisinopril changed to losartan.      She returns today for follow up. She denies cardiac symptoms. No chest pain. No recurrent COPD exacerbation. Labs 7/9/2023: H/H12.4/38.9, D-dimer 0.32, BNP 11, BUN/CR 13/1.35, glucose 222, GFR 41, normal LFT. 6/7/2023-normal CBC, GFR 71, LDL 92, HDL 39, , , A1c 6.9%. Lipids managed with diet alone.        Cardiac History:    Past Surgical History:   Procedure Laterality Date    CARDIOVASCULAR STRESS TEST  09/13/2011    1 Min. 36 Secs. 4.6 METS. 108% THR. BP- 220/90. EF > 65%. Negative.    CARDIOVASCULAR STRESS TEST  07/01/2020    Lexiscan- EF 77%. R/O Apical Ischemia.    CARDIOVASCULAR STRESS TEST  02/07/2023    Lexiscan- EF > 70%. 190/60.R/O GI Artifacts    ECHO - CONVERTED  09/13/2011    Tachycardic. EF > 60%. Mild MR. RVSP- 29 mmHg.    ECHO - CONVERTED  07/01/2020    EF 65%. Trace-Mild MR    ECHO - CONVERTED  02/07/2023    EF 65%. Mild MR. RVSP- 15 mmHg    US  CAROTID UNILATERAL  06/14/2021    No stenosis bilaterally      Current Outpatient Medications   Medication Sig Dispense Refill    Ascorbic Acid (VITAMIN C PO) Take 1,000 mg by mouth Daily.      aspirin 81 MG chewable tablet Chew 1 tablet Daily.      Calcium Carbonate-Vitamin D (CALTRATE 600+D PO) Take 1 tablet by mouth Daily.      cholecalciferol (VITAMIN D3) 25 MCG (1000 UT) tablet Take 1 tablet by mouth Daily.      COLLAGEN PO Take  by mouth Daily.      Glucosamine-Chondroitin 250-200 MG tablet Take 1 tablet by mouth Daily.      isosorbide mononitrate (IMDUR) 30 MG 24 hr tablet Take 1 tablet by mouth Daily. 90 tablet 3    letrozole (FEMARA) 2.5 MG tablet Take  by mouth Daily.      losartan (COZAAR) 25 MG tablet Take 1 tablet by mouth Daily. 90 tablet 3    MAGNESIUM PO Take 500 mg by mouth Daily.      metoprolol tartrate (LOPRESSOR) 50 MG tablet Take 0.5 tablets by mouth 2 (Two) Times a Day. 90 tablet 3    montelukast (SINGULAIR) 10 MG tablet Take 1 tablet by mouth Every Night.      Multiple Vitamins-Minerals (ICAPS) capsule Take 2 tablets by mouth Daily.      multivitamin with minerals tablet tablet Take 1 tablet by mouth Daily.      triamterene-hydrochlorothiazide (MAXZIDE-25) 37.5-25 MG per tablet Takes 1/2-1 tablet daily 90 tablet 3    Zinc 50 MG tablet Take 1 tablet by mouth Daily.       No current facility-administered medications for this visit.     Sulfa antibiotics and Latex    Past Medical History:   Diagnosis Date    Cancer 2011    Left breast     Hypertension      Social History     Socioeconomic History    Marital status:    Tobacco Use    Smoking status: Never     Passive exposure: Past    Smokeless tobacco: Never   Vaping Use    Vaping Use: Never used   Substance and Sexual Activity    Alcohol use: Not Currently    Drug use: Never    Sexual activity: Defer     Family History   Problem Relation Age of Onset    Lung disease Mother     Heart attack Father     Hypertension Sister     Cancer Sister  "    Kidney disease Brother     Heart disease Brother     Diabetes Brother      Review of Systems   Constitutional: Positive for weight loss (-3). Negative for decreased appetite and malaise/fatigue.   HENT: Negative.     Eyes:  Negative for blurred vision.   Cardiovascular:  Negative for chest pain, dyspnea on exertion, leg swelling, palpitations and syncope.   Respiratory:  Negative for shortness of breath and sleep disturbances due to breathing.    Endocrine: Negative.    Hematologic/Lymphatic: Negative for bleeding problem. Does not bruise/bleed easily.   Skin: Negative.    Musculoskeletal:  Negative for falls and myalgias.   Gastrointestinal:  Negative for abdominal pain, heartburn and melena.   Genitourinary:  Negative for hematuria.   Neurological:  Negative for dizziness and light-headedness.   Psychiatric/Behavioral:  Negative for altered mental status.    Allergic/Immunologic: Negative.       Objective     /70 (BP Location: Right arm)   Pulse 64   Ht 162 cm (63.78\")   Wt 75.7 kg (166 lb 12.8 oz)   BMI 28.83 kg/m²     Vitals and nursing note reviewed.   Constitutional:       General: Not in acute distress.     Appearance: Well-developed. Not diaphoretic.   Eyes:      Pupils: Pupils are equal, round, and reactive to light.   HENT:      Head: Normocephalic.   Pulmonary:      Effort: Pulmonary effort is normal. No respiratory distress.      Breath sounds: Normal breath sounds.   Cardiovascular:      Normal rate. Regular rhythm.   Pulses:     Intact distal pulses.   Edema:     Peripheral edema absent.   Abdominal:      General: Bowel sounds are normal.      Palpations: Abdomen is soft.   Musculoskeletal: Normal range of motion.      Cervical back: Normal range of motion. Skin:     General: Skin is warm and dry.   Neurological:      Mental Status: Alert and oriented to person, place, and time.        Procedures          Problem List Items Addressed This Visit          Cardiac and Vasculature    " Hypertriglyceridemia    Essential hypertension - Primary       Endocrine and Metabolic    Metabolic syndrome    Hyperglycemia    Type 2 diabetes mellitus without complication, without long-term current use of insulin       Pulmonary and Pneumonias    Shortness of breath      HTN  -stable at 124/70  -continue losartan, metoprolol, triamterene/hctz     Hyperlipidemia  -well controlled, LDL 92, Tri 138  -managed with diet. Limit CHO  -labs per Dr. Del Toro      Diabetes  -stable, A1C 6.9%  -continue asp, statin, ARB     COPD exacerbation  -lifelong non-smoker, ?chronic bronchitis  -no recent exacerbation    Abnormal stress   -nuclear images 2020, small apical ischemia  -Imdur 30 mg added  -repeat nuclear scan 2/2023 showed no ischemia, continue Imdur.     No changes made. She appears stable. FU 6 months, sooner as needed.     BMI is >= 25 and <30. (Overweight) The following options were offered after discussion;: nutrition counseling/recommendations               Electronically signed by KAMLESH Roberts,  February 23, 2024 08:37 EST

## 2024-08-26 ENCOUNTER — OFFICE VISIT (OUTPATIENT)
Dept: CARDIOLOGY | Facility: CLINIC | Age: 79
End: 2024-08-26
Payer: MEDICARE

## 2024-08-26 VITALS
BODY MASS INDEX: 29.67 KG/M2 | HEART RATE: 68 BPM | WEIGHT: 173.8 LBS | DIASTOLIC BLOOD PRESSURE: 70 MMHG | SYSTOLIC BLOOD PRESSURE: 128 MMHG | HEIGHT: 64 IN

## 2024-08-26 DIAGNOSIS — R06.02 SHORTNESS OF BREATH: ICD-10-CM

## 2024-08-26 DIAGNOSIS — E78.1 HYPERTRIGLYCERIDEMIA: Primary | ICD-10-CM

## 2024-08-26 DIAGNOSIS — I10 ESSENTIAL HYPERTENSION: ICD-10-CM

## 2024-08-26 DIAGNOSIS — E11.9 TYPE 2 DIABETES MELLITUS WITHOUT COMPLICATION, WITHOUT LONG-TERM CURRENT USE OF INSULIN: ICD-10-CM

## 2024-08-26 RX ORDER — GABAPENTIN 300 MG/1
300 CAPSULE ORAL 3 TIMES DAILY
COMMUNITY

## 2024-08-26 RX ORDER — ISOSORBIDE MONONITRATE 30 MG/1
30 TABLET, EXTENDED RELEASE ORAL DAILY
Qty: 90 TABLET | Refills: 3 | Status: SHIPPED | OUTPATIENT
Start: 2024-08-26

## 2024-08-26 RX ORDER — METOPROLOL TARTRATE 50 MG
25 TABLET ORAL 2 TIMES DAILY
Qty: 90 TABLET | Refills: 3 | Status: SHIPPED | OUTPATIENT
Start: 2024-08-26

## 2024-08-26 RX ORDER — FEXOFENADINE HCL 180 MG/1
180 TABLET ORAL DAILY
COMMUNITY

## 2024-08-26 RX ORDER — DENOSUMAB 60 MG/ML
INJECTION SUBCUTANEOUS
COMMUNITY

## 2024-08-26 RX ORDER — LOSARTAN POTASSIUM 25 MG/1
25 TABLET ORAL DAILY
Qty: 90 TABLET | Refills: 3 | Status: SHIPPED | OUTPATIENT
Start: 2024-08-26

## 2024-08-26 RX ORDER — TRIAMTERENE/HYDROCHLOROTHIAZID 37.5-25 MG
TABLET ORAL
Qty: 90 TABLET | Refills: 3 | Status: SHIPPED | OUTPATIENT
Start: 2024-08-26

## 2024-08-26 NOTE — PROGRESS NOTES
"Chief Complaint   Patient presents with    Follow-up     Cardiac management    Lab     Has copy of most recent labs.    Shortness of Breath     Has had couple episodes of shortness of breath, reports relief with inhaler. She states \"I am to have breathing test next week\".     Med Refill     Needs refills on cardiac medications-90 day     Subjective       Khushboo Werner is a 79 y.o. female with HTN, diabetes and strong family history of IHD. Stress test in 2011 for chest pain found to have hypertensive response but no ischemia. Beta blockers started. She was diagnosed with (L) breast CA in 2018 and underwent lumpectomy and radiation. She was referred back in June 2020 after she noticed increasing dyspnea on exertion. Cardiac work up repeated with Lexiscan showing normal blood pressure response and mild changes in the apex. LV function normal. Imdur started with plan for cath if symptoms persisted. Carotid US secondary to dizziness and brother passing from acute stroke showed no stenosis. Repeat cardiac work up 2/2023 showed no ischemia. She was admitted to [facility] with SOB, COPD exacerbation on 7/9/23. CXR showed emphysema, negative d dimer, BNP. Treated with steroids and antibiotics. Lisinopril changed to losartan.      She returns today for follow up. She denies cardiac symptoms. No chest pain.  Continues to have intermittent dyspnea, cough and wheeze.  Planning to have PFT next week.  She uses albuterol as needed.  She also had kidney stone with lithotripsy.  Labs on 8/8/2024, alpha 1 antitrypsin within normal range, A1c 7.2%, GFR 76, normal LFT and electrolytes.  Lipids in 2023 LDL 92, HDL 39, , . Lipids managed with diet alone.        Cardiac History:    Past Surgical History:   Procedure Laterality Date    CARDIOVASCULAR STRESS TEST  09/13/2011    1 Min. 36 Secs. 4.6 METS. 108% THR. BP- 220/90. EF > 65%. Negative.    CARDIOVASCULAR STRESS TEST  07/01/2020    Lexiscan- EF 77%. R/O Apical Ischemia.    " CARDIOVASCULAR STRESS TEST  02/07/2023    Lexiscan- EF > 70%. 190/60.R/O GI Artifacts    ECHO - CONVERTED  09/13/2011    Tachycardic. EF > 60%. Mild MR. RVSP- 29 mmHg.    ECHO - CONVERTED  07/01/2020    EF 65%. Trace-Mild MR    ECHO - CONVERTED  02/07/2023    EF 65%. Mild MR. RVSP- 15 mmHg    US CAROTID UNILATERAL  06/14/2021    No stenosis bilaterally      Current Outpatient Medications   Medication Sig Dispense Refill    Ascorbic Acid (VITAMIN C PO) Take 1,000 mg by mouth Daily.      aspirin 81 MG chewable tablet Chew 1 tablet Daily.      BLACK PEPPER-TURMERIC PO Take  by mouth 2 (Two) Times a Day.      Calcium Carbonate-Vitamin D (CALTRATE 600+D PO) Take 1 tablet by mouth Daily.      cholecalciferol (VITAMIN D3) 25 MCG (1000 UT) tablet Take 1 tablet by mouth Daily.      COLLAGEN PO Take  by mouth Daily.      denosumab (Prolia) 60 MG/ML solution prefilled syringe syringe Inject  under the skin into the appropriate area as directed Every 6 (Six) Months.      fexofenadine (ALLEGRA) 180 MG tablet Take 1 tablet by mouth Daily.      gabapentin (NEURONTIN) 300 MG capsule Take 1 capsule by mouth 3 (Three) Times a Day.      Glucosamine-Chondroitin 250-200 MG tablet Take 1 tablet by mouth Daily.      isosorbide mononitrate (IMDUR) 30 MG 24 hr tablet Take 1 tablet by mouth Daily. 90 tablet 3    letrozole (FEMARA) 2.5 MG tablet Take  by mouth Daily.      losartan (COZAAR) 25 MG tablet Take 1 tablet by mouth Daily. 90 tablet 3    MAGNESIUM PO Take 500 mg by mouth Daily.      metoprolol tartrate (LOPRESSOR) 50 MG tablet Take 0.5 tablets by mouth 2 (Two) Times a Day. 90 tablet 3    montelukast (SINGULAIR) 10 MG tablet Take 1 tablet by mouth Every Night.      Multiple Vitamins-Minerals (ICAPS) capsule Take 2 tablets by mouth Daily.      multivitamin with minerals tablet tablet Take 1 tablet by mouth Daily.      triamterene-hydrochlorothiazide (MAXZIDE-25) 37.5-25 MG per tablet Takes 1/2-1 tablet daily 90 tablet 3    Zinc 50 MG  "tablet Take 1 tablet by mouth Daily.       No current facility-administered medications for this visit.     Sulfa antibiotics and Latex    Past Medical History:   Diagnosis Date    Cancer 2011    Left breast     History of kidney stones     Hypertension      Social History     Socioeconomic History    Marital status:    Tobacco Use    Smoking status: Never     Passive exposure: Past    Smokeless tobacco: Never   Vaping Use    Vaping status: Never Used   Substance and Sexual Activity    Alcohol use: Not Currently    Drug use: Never    Sexual activity: Defer     Family History   Problem Relation Age of Onset    Lung disease Mother     Heart attack Father     Hypertension Sister     Cancer Sister     Kidney disease Brother     Heart disease Brother     Diabetes Brother      Review of Systems   Constitutional: Positive for weight gain (7). Negative for decreased appetite and malaise/fatigue.   HENT: Negative.     Eyes:  Negative for blurred vision.   Cardiovascular:  Positive for dyspnea on exertion. Negative for chest pain, leg swelling, palpitations and syncope.   Respiratory:  Positive for cough and wheezing. Negative for shortness of breath and sleep disturbances due to breathing.    Endocrine: Negative.    Hematologic/Lymphatic: Negative for bleeding problem. Does not bruise/bleed easily.   Skin: Negative.    Musculoskeletal:  Negative for falls and myalgias.   Gastrointestinal:  Negative for abdominal pain, heartburn and melena.   Genitourinary:  Negative for hematuria.   Neurological:  Negative for dizziness and light-headedness.   Psychiatric/Behavioral:  Negative for altered mental status.    Allergic/Immunologic: Negative.       Objective     /70 (BP Location: Right arm, Patient Position: Sitting)   Pulse 68   Ht 162 cm (63.78\")   Wt 78.8 kg (173 lb 12.8 oz)   BMI 30.04 kg/m²     Vitals and nursing note reviewed.   Constitutional:       General: Not in acute distress.     Appearance: " Well-developed. Not diaphoretic.   Eyes:      Pupils: Pupils are equal, round, and reactive to light.   HENT:      Head: Normocephalic.   Pulmonary:      Effort: Pulmonary effort is normal. No respiratory distress.      Breath sounds: Normal breath sounds.   Cardiovascular:      Normal rate. Regular rhythm.   Pulses:     Intact distal pulses.   Edema:     Peripheral edema absent.   Abdominal:      General: Bowel sounds are normal.      Palpations: Abdomen is soft.   Musculoskeletal: Normal range of motion.      Cervical back: Normal range of motion. Skin:     General: Skin is warm and dry.   Neurological:      Mental Status: Alert and oriented to person, place, and time.        Procedures          Problem List Items Addressed This Visit          Cardiac and Vasculature    Hypertriglyceridemia - Primary    Essential hypertension    Relevant Medications    losartan (COZAAR) 25 MG tablet    metoprolol tartrate (LOPRESSOR) 50 MG tablet    triamterene-hydrochlorothiazide (MAXZIDE-25) 37.5-25 MG per tablet       Endocrine and Metabolic    Type 2 diabetes mellitus without complication, without long-term current use of insulin       Pulmonary and Pneumonias    Shortness of breath      HTN  -stable at 128/70  -continue losartan, metoprolol, triamterene/hctz     Hyperlipidemia  -well controlled, LDL 92, Tri 138  -managed with diet. Limit CHO  -labs per Dr. Morgan     Diabetes  -A1c increased to 7.2%, she has been treated with steroids   -continue asp, ARB, she is not on statin     COPD exacerbation  -lifelong non-smoker, ?chronic bronchitis, asthma  -Planning to have PFT next week     Abnormal stress   -nuclear images 2020, small apical ischemia  -Imdur 30 mg added  -repeat nuclear scan 2/2023 showed no ischemia, continue Imdur.      No changes made.  Cardiac status appears stable. FU 6 months, sooner as needed.               Electronically signed by KAMLESH Roberts,  August 27, 2024 11:40 EDT

## 2024-10-29 ENCOUNTER — OFFICE VISIT (OUTPATIENT)
Dept: PULMONOLOGY | Facility: CLINIC | Age: 79
End: 2024-10-29
Payer: MEDICARE

## 2024-10-29 VITALS
BODY MASS INDEX: 30.05 KG/M2 | TEMPERATURE: 98 F | WEIGHT: 176 LBS | OXYGEN SATURATION: 98 % | SYSTOLIC BLOOD PRESSURE: 132 MMHG | HEIGHT: 64 IN | DIASTOLIC BLOOD PRESSURE: 78 MMHG | HEART RATE: 68 BPM

## 2024-10-29 DIAGNOSIS — J30.2 SEASONAL AND PERENNIAL ALLERGIC RHINITIS: ICD-10-CM

## 2024-10-29 DIAGNOSIS — J45.909 IDIOPATHIC ASTHMA: Primary | ICD-10-CM

## 2024-10-29 DIAGNOSIS — R06.02 SHORTNESS OF BREATH: ICD-10-CM

## 2024-10-29 DIAGNOSIS — J30.89 SEASONAL AND PERENNIAL ALLERGIC RHINITIS: ICD-10-CM

## 2024-10-29 DIAGNOSIS — Z78.9 NON-SMOKER: ICD-10-CM

## 2024-10-29 RX ORDER — FLUTICASONE FUROATE AND VILANTEROL 100; 25 UG/1; UG/1
1 POWDER RESPIRATORY (INHALATION)
Qty: 1 EACH | Refills: 11 | Status: SHIPPED | OUTPATIENT
Start: 2024-10-29 | End: 2024-10-31 | Stop reason: SDUPTHER

## 2024-10-29 NOTE — PROGRESS NOTES
"  PULMONARY  NOTE    Chief Complaint     Chronic cough, dyspnea, wheezing, non-smoker    History of Present Illness     79-year-old female referred for persistent respiratory symptoms    She has had persistent coughing and wheezing for quite a while  She is never had hemoptysis    She has albuterol which she uses occasionally and she thinks it helps  However, she \"forgets\" to use it    She does feel that her symptoms are \"50 to 75%\" better since she has been started on Allegra    She does have some sinus drainage and postnasal drip    She denies regular reflux symptoms    She is a lifelong non-smoker    She is concerned about a chest x-ray interpretation done a year ago at [facility] where the radiologist opined that she had emphysema  She had pulmonary function test done in August at [facility], as well    Patient Active Problem List   Diagnosis    Chest pressure    Metabolic syndrome    Shortness of breath    Hypertriglyceridemia    Hyperglycemia    Essential hypertension    Type 2 diabetes mellitus without complication, without long-term current use of insulin    Seasonal and perennial allergic rhinitis    Non-smoker      Allergies   Allergen Reactions    Sulfa Antibiotics Itching    Latex Itching       Current Outpatient Medications:     Ascorbic Acid (VITAMIN C PO), Take 1,000 mg by mouth Daily., Disp: , Rfl:     aspirin 81 MG chewable tablet, Chew 1 tablet Daily., Disp: , Rfl:     BLACK PEPPER-TURMERIC PO, Take  by mouth 2 (Two) Times a Day., Disp: , Rfl:     Calcium Carbonate-Vitamin D (CALTRATE 600+D PO), Take 1 tablet by mouth Daily., Disp: , Rfl:     cholecalciferol (VITAMIN D3) 25 MCG (1000 UT) tablet, Take 1 tablet by mouth Daily., Disp: , Rfl:     COLLAGEN PO, Take  by mouth Daily., Disp: , Rfl:     denosumab (Prolia) 60 MG/ML solution prefilled syringe syringe, Inject  under the skin into the appropriate area as directed Every 6 (Six) Months., Disp: , Rfl:     fexofenadine (ALLEGRA) 180 MG " tablet, Take 1 tablet by mouth Daily., Disp: , Rfl:     gabapentin (NEURONTIN) 300 MG capsule, Take 1 capsule by mouth 3 (Three) Times a Day., Disp: , Rfl:     Glucosamine-Chondroitin 250-200 MG tablet, Take 1 tablet by mouth Daily., Disp: , Rfl:     isosorbide mononitrate (IMDUR) 30 MG 24 hr tablet, Take 1 tablet by mouth Daily., Disp: 90 tablet, Rfl: 3    letrozole (FEMARA) 2.5 MG tablet, Take  by mouth Daily., Disp: , Rfl:     losartan (COZAAR) 25 MG tablet, Take 1 tablet by mouth Daily., Disp: 90 tablet, Rfl: 3    MAGNESIUM PO, Take 500 mg by mouth Daily., Disp: , Rfl:     metoprolol tartrate (LOPRESSOR) 50 MG tablet, Take 0.5 tablets by mouth 2 (Two) Times a Day., Disp: 90 tablet, Rfl: 3    montelukast (SINGULAIR) 10 MG tablet, Take 1 tablet by mouth Every Night., Disp: , Rfl:     Multiple Vitamins-Minerals (ICAPS) capsule, Take 2 tablets by mouth Daily., Disp: , Rfl:     multivitamin with minerals tablet tablet, Take 1 tablet by mouth Daily., Disp: , Rfl:     triamterene-hydrochlorothiazide (MAXZIDE-25) 37.5-25 MG per tablet, Takes 1/2-1 tablet daily, Disp: 90 tablet, Rfl: 3    Zinc 50 MG tablet, Take 1 tablet by mouth Daily., Disp: , Rfl:   MEDICATION LIST AND ALLERGIES REVIEWED.    Family History   Problem Relation Age of Onset    Lung disease Mother     Heart attack Father     Hypertension Sister     Cancer Sister     Kidney disease Brother     Heart disease Brother     Diabetes Brother      Social History     Tobacco Use    Smoking status: Never     Passive exposure: Past    Smokeless tobacco: Never   Vaping Use    Vaping status: Never Used   Substance Use Topics    Alcohol use: Not Currently    Drug use: Never     Social History     Social History Narrative    Non-smoker    Has not used any vaping or inhalation products     FAMILY AND SOCIAL HISTORY REVIEWED.    Review of Systems  IF PRESENT REFER TO SCANNED ROS SHEET FROM SAME DATE  OTHERWISE ROS OBTAINED AND NON-CONTRIBUTORY OVER HPI.    /78    "Pulse 68   Temp 98 °F (36.7 °C)   Ht 162 cm (63.78\")   Wt 79.8 kg (176 lb)   SpO2 98%   BMI 30.42 kg/m²   Physical Exam  Vitals and nursing note reviewed.   Constitutional:       General: She is not in acute distress.     Appearance: She is well-developed. She is not diaphoretic.   HENT:      Head: Normocephalic and atraumatic.   Neck:      Thyroid: No thyromegaly.   Cardiovascular:      Rate and Rhythm: Normal rate and regular rhythm.      Heart sounds: Normal heart sounds. No murmur heard.  Pulmonary:      Effort: Pulmonary effort is normal.      Breath sounds: Normal breath sounds. No stridor.   Lymphadenopathy:      Cervical: No cervical adenopathy.      Upper Body:      Right upper body: No supraclavicular or epitrochlear adenopathy.      Left upper body: No supraclavicular or epitrochlear adenopathy.   Skin:     General: Skin is warm and dry.   Neurological:      Mental Status: She is alert and oriented to person, place, and time.   Psychiatric:         Behavior: Behavior normal.         Results     Chest x-ray done at [facility] was a portable film revealed no acute intrathoracic abnormalities    Chest x-ray today reveals no effusions, infiltrates, or consolidation    PFTs done at [facility] on 8/28/2024 reviewed  Airway obstruction based on a reduced FEV1 to FVC ratio prebronchodilator with a reduced TLC and a normal diffusion capacity    Immunization History   Administered Date(s) Administered    COVID-19 (MODERNA) 1st,2nd,3rd Dose Monovalent 02/03/2021, 03/05/2021    COVID-19 (MODERNA) Monovalent Original Booster 12/14/2021    Fluad Quad 65+ 10/13/2023    Fluzone High-Dose 65+YRS 10/15/2024    Fluzone High-Dose 65+yrs 09/25/2020    Hepatitis A 01/04/2019, 07/19/2019    Pneumococcal Polysaccharide (PPSV23) 11/08/2021    Shingrix 06/21/2022, 12/19/2022    Td (TDVAX) 04/07/1998    Tdap 08/08/2024     Problem List       ICD-10-CM ICD-9-CM   1. Asthma  J45.909 493.90   2. Seasonal and perennial " "allergic rhinitis  J30.89 477.9    J30.2    3. Shortness of breath  R06.02 786.05   4. Non-smoker  Z78.9 V49.89       Discussion     We reviewed her test results    Her chest x-ray today is clear and unremarkable  I have no idea why a radiologist would diagnose somebody with \"emphysema\" based on a portable chest x-ray  I reassured her that she does not have emphysema/COPD    She might have asthma  PFTs that she had in August do not contain Z-scores so I am not sure of the significance of her reduced FEV1 to FVC ratio  We will follow-up on PFTs in the future with Z-scores    She had a reduced TLC in [facility]  However, her single breath alveolar volume was 3.83 L which exceeded her TLC of 3.3 L which is physiologically impossible  Therefore, the TLC determination is probably artifactual    She may have asthma  Have recommended maintenance asthma therapy  Only thing we had in the office today was Trelegy and it does not look like her insurance is going to cover that  I do not really think she needs to be on a triple medication, anyway  I was going to prescribed Breo 100 and continue albuterol as needed    Will see her back in 1-2 months for follow-up    Moderate level of Medical Decision Making complexity based on 2 or more chronic stable illnesses and an independent review of test results and/or prescription drug management.    Rudy Torrez MD  Note electronically signed    CC: Simon Morgan,   "

## 2024-10-31 RX ORDER — FLUTICASONE FUROATE AND VILANTEROL 100; 25 UG/1; UG/1
1 POWDER RESPIRATORY (INHALATION)
Qty: 60 EACH | Refills: 3 | Status: SHIPPED | OUTPATIENT
Start: 2024-10-31

## 2024-10-31 NOTE — TELEPHONE ENCOUNTER
Filled Rx Breo 100 per chart via fax sent to [facility] per pt's request due to cost savings.

## 2024-11-07 ENCOUNTER — TELEPHONE (OUTPATIENT)
Dept: PULMONOLOGY | Facility: CLINIC | Age: 79
End: 2024-11-07
Payer: MEDICARE

## 2024-11-07 DIAGNOSIS — R06.02 SHORTNESS OF BREATH: Primary | ICD-10-CM

## 2024-11-07 NOTE — TELEPHONE ENCOUNTER
Patient called stating Breo is too expensive, advair/wixela is not covered under her insurance but dulera appears to be, okay to change to that?

## 2024-11-11 RX ORDER — MOMETASONE FUROATE AND FORMOTEROL FUMARATE DIHYDRATE 200; 5 UG/1; UG/1
2 AEROSOL RESPIRATORY (INHALATION)
Qty: 13 G | Refills: 11 | Status: SHIPPED | OUTPATIENT
Start: 2024-11-11

## 2024-12-30 NOTE — PROGRESS NOTES
[facility] Pulmonary Follow up    CHIEF COMPLAINT    Persistent respiratory symptoms    HISTORY OF PRESENT ILLNESS    HPI:   Khushboo Werner is a 79 y.o.female followed by pulmonary regarding her persistent respiratory symptoms.     Patient establish care with our clinic in October 2024 with Dr. Torrez regarding her persistent cough and wheezing.  PFTs performed at [facility] in August 2024 compatible with airway obstruction and restriction with a normal DLCO.  The TLC was felt to be artifactual as her single breath alveolar volume was 3.3 L which exceeded her TLC of 3.3 L.      Due to concern for underlying asthma, she was prescribed Breo 100 and albuterol as needed.  She is also on Singulair.  Prior labs did show some peripheral eosinophilia as high as 1130 back in 2023.    Per OSH chest imaging concerning for emphysema, however this was not appreciated by Dr. Torrez.    Does have allergic rhinitis on OTC antihistamines.    She is lifelong non-smoker.    Echocardiogram from 2023 did show grade 1 diastolic dysfunction and she is on diuretic therapy.    History of left breast CA s/p lumpectomy and radiation (completed in April 2019).     Interval history:   Has done better on the Breo, but is only been on this for a month. Has had multiple interruptions in her therapy due to insurance coverage issues, but received her dose from [facility] [place] in [place] without a prescription.      Denies any further respiratory issues at current.    Denies recent ED visits, hospitalizations, or exacerbations.     Denies fever, chills, night sweats, or hemoptysis. No recent sick contacts. No chest pain or palpitations. Denies lower extremity swelling or calf tenderness.     Patient Active Problem List   Diagnosis    Chest pressure    Metabolic syndrome    Shortness of breath    Hypertriglyceridemia    Hyperglycemia    Essential hypertension    Type 2 diabetes mellitus without complication, without long-term current use of  insulin    Seasonal and perennial allergic rhinitis    Non-smoker    Asthma       Allergies   Allergen Reactions    Codeine Unknown - Low Severity    Sulfa Antibiotics Itching    Latex Itching       Current Outpatient Medications:     albuterol sulfate  (90 Base) MCG/ACT inhaler, INHALE 2 PUFFS EVERY 4 HOURS BY INHALATION ROUTE., Disp: , Rfl:     aspirin 81 MG chewable tablet, Chew 1 tablet Daily., Disp: , Rfl:     BLACK PEPPER-TURMERIC PO, Take  by mouth 2 (Two) Times a Day., Disp: , Rfl:     calcium carb-cholecalciferol 600-10 MG-MCG tablet per tablet, Take 1 tablet by mouth Daily., Disp: , Rfl:     COLLAGEN PO, Take  by mouth Daily., Disp: , Rfl:     denosumab (Prolia) 60 MG/ML solution prefilled syringe syringe, Inject  under the skin into the appropriate area as directed Every 6 (Six) Months., Disp: , Rfl:     fexofenadine (ALLEGRA) 180 MG tablet, Take 1 tablet by mouth Daily., Disp: , Rfl:     Fluticasone Furoate-Vilanterol 100-25 MCG/ACT aerosol powder , 1 puff Daily., Disp: , Rfl:     gabapentin (NEURONTIN) 300 MG capsule, Take 1 capsule by mouth 3 (Three) Times a Day., Disp: , Rfl:     Glucosamine-Chondroitin 250-200 MG tablet, Take 1 tablet by mouth Daily., Disp: , Rfl:     isosorbide mononitrate (IMDUR) 30 MG 24 hr tablet, Take 1 tablet by mouth Daily., Disp: 90 tablet, Rfl: 3    letrozole (FEMARA) 2.5 MG tablet, Take  by mouth Daily., Disp: , Rfl:     losartan (COZAAR) 25 MG tablet, Take 1 tablet by mouth Daily., Disp: 90 tablet, Rfl: 3    MAGNESIUM PO, Take 500 mg by mouth Daily., Disp: , Rfl:     metoprolol tartrate (LOPRESSOR) 50 MG tablet, Take 0.5 tablets by mouth 2 (Two) Times a Day., Disp: 90 tablet, Rfl: 3    montelukast (SINGULAIR) 10 MG tablet, Take 1 tablet by mouth Every Night., Disp: , Rfl:     multivitamin with minerals tablet tablet, Take 1 tablet by mouth Daily., Disp: , Rfl:     triamterene-hydrochlorothiazide (MAXZIDE-25) 37.5-25 MG per tablet, Takes 1/2-1 tablet daily, Disp: 90  "tablet, Rfl: 3    Zinc 50 MG tablet, Take 1 tablet by mouth Daily., Disp: , Rfl:     Ascorbic Acid (VITAMIN C PO), Take 1,000 mg by mouth Daily. (Patient not taking: Reported on 12/31/2024), Disp: , Rfl:     Calcium Carbonate-Vitamin D (CALTRATE 600+D PO), Take 1 tablet by mouth Daily. (Patient not taking: Reported on 12/31/2024), Disp: , Rfl:     cholecalciferol (VITAMIN D3) 25 MCG (1000 UT) tablet, Take 1 tablet by mouth Daily. (Patient not taking: Reported on 12/31/2024), Disp: , Rfl:     metoprolol succinate XL (TOPROL-XL) 25 MG 24 hr tablet, Take 1 tablet by mouth Daily. (Patient not taking: Reported on 12/31/2024), Disp: , Rfl:     mometasone-formoterol (Dulera) 200-5 MCG/ACT inhaler, Inhale 2 puffs 2 (Two) Times a Day. (Patient not taking: Reported on 12/31/2024), Disp: 13 g, Rfl: 11    Multiple Vitamins-Minerals (ICAPS) capsule, Take 2 tablets by mouth Daily. (Patient not taking: Reported on 12/31/2024), Disp: , Rfl:   MEDICATION LIST AND ALLERGIES REVIEWED.    Social History     Tobacco Use    Smoking status: Never     Passive exposure: Past    Smokeless tobacco: Never   Vaping Use    Vaping status: Never Used   Substance Use Topics    Alcohol use: Not Currently    Drug use: Never       FAMILY AND SOCIAL HISTORY REVIEWED.    Review of Systems   Constitutional:  Negative for chills, fatigue and fever.   Respiratory:  Positive for cough and shortness of breath. Negative for chest tightness and wheezing.    Cardiovascular:  Negative for chest pain, palpitations and leg swelling.   Skin:  Negative for color change.   Psychiatric/Behavioral:  Negative for sleep disturbance.    All other systems reviewed and are negative.      /72   Pulse 89   Ht 160 cm (63\")   Wt 80.7 kg (178 lb)   SpO2 97%   BMI 31.53 kg/m²     Immunization History   Administered Date(s) Administered    COVID-19 (MODERNA) 1st,2nd,3rd Dose Monovalent 02/03/2021, 03/05/2021    COVID-19 (MODERNA) Monovalent Original Booster 12/14/2021 "    Fluad Quad 65+ 10/13/2023    Fluzone High-Dose 65+YRS 10/15/2024    Fluzone High-Dose 65+yrs 09/25/2020    Hepatitis A 01/04/2019, 07/19/2019    Pneumococcal Conjugate 20-Valent (PCV20) 12/02/2024    Pneumococcal Polysaccharide (PPSV23) 11/08/2021    Shingrix 06/21/2022, 12/19/2022    Td (TDVAX) 04/07/1998    Tdap 08/08/2024       Physical Exam  Vitals reviewed.   Constitutional:       General: She is not in acute distress.     Appearance: Normal appearance.   Cardiovascular:      Rate and Rhythm: Normal rate and regular rhythm.      Pulses: Normal pulses.      Heart sounds: Normal heart sounds.   Pulmonary:      Effort: Pulmonary effort is normal. No respiratory distress.      Breath sounds: No wheezing, rhonchi or rales.   Skin:     General: Skin is warm and dry.   Neurological:      General: No focal deficit present.      Mental Status: She is alert and oriented to person, place, and time.         RESULTS    PFTs:  8/28/2024 at [facility] reviewed    Imaging:   CXR PA/lateral 10/29/2024  Cardiac silhouette is within normal limits in size  CT ray shows 11 x 29  Surgical clips over the left anterior chest  No effusions, infiltrates, consolidation  Probably unchanged comparison to prior portable film from 7/9/2023    Cardiac:   Results for orders placed during the hospital encounter of 02/07/23  Adult Transthoracic Echo Complete W/ Cont if Necessary Per Protocol  Interpretation Summary    Left ventricular wall thickness is consistent with borderline concentric hypertrophy.    Left ventricular ejection fraction appears to be 61 - 65%.    Left ventricular diastolic function is consistent with (grade Ia w/high LAP) impaired relaxation.    No aortic valve regurgitation or stenosis is present.    Mild mitral valve regurgitation is present.    Calculated right ventricular systolic pressure from tricuspid regurgitation is 15 mmHg.       PROBLEM LIST    Problem List Items Addressed This Visit       Shortness of  breath    Relevant Orders    CT Chest Without Contrast    Seasonal and perennial allergic rhinitis - Primary    Asthma    Relevant Medications    albuterol sulfate  (90 Base) MCG/ACT inhaler    Fluticasone Furoate-Vilanterol 100-25 MCG/ACT aerosol powder      DISCUSSION    Ms. Werner was seen today for follow-up and is stable from respiratory standpoint.  She was implemented on the Breo 100 in the setting of suspected underlying asthma with improvement of her persistent respiratory symptoms.    Has had difficulty with her insurance regarding multiple inhalers and is currently receiving her Breo from a location out of [facility].  This is not a prescription nor a pharmacy.    Asthma  Persistent respiratory symptoms improved after initiation of the Breo 100  Asthma is currently well controlled  Continue Breo 100 for medication management.  Rinse mouth after use to prevent thrush  She is currently receiving this out of the location [facility] without a prescription and the location is not a pharmacy  I do want her to contact her insurance to see what LABA plus ICS containing inhaler is the most cost efficient for her as her current modality of obtaining inhalers will not be consistent and I do not want frequent interruptions in her therapy.  She verbalizes understanding and will get back with our office regarding this.  Continue albuterol as needed  Prior labs back in 2023 did show significant peripheral eosinophilia with an AEC greater than 1000.  Currently her symptoms are well-controlled, however should they worsen I will increase her to the high-dose ICS therapy for 3 months and repeat labs for further evaluation for consideration of a biologic in the future  OSH PFTs as above and a poor quality study.  Repeat at next visit.  Avoid known triggers   Educated on pursed-lip breathing technique  Encouraged to remain up-to-date on vaccinations-I do encourage her to receive the RSV vaccine  Will obtain  CT chest imaging as there is none on file.  She does have a history of a left breast cancer s/p lumpectomy and did receive radiation to the left chest wall.  It would be beneficial to see if she has any residual scarring from this.    Return to clinic in 6 months with PFTs or may see me sooner if needed.       I personally spent a total of 35 minutes on patient visit today including chart review, face to face with the patient obtaining the history and physical exam, review of pertinent images and tests, counseling and discussion and/or coordination of care as described above, and documentation.  Total time excludes time spent on other separate services such as performing procedures or test interpretation, if applicable.    Electronically signed by KAMLESH Ortega, 12/31/24, 3:56 PM EST.    Please note that portions of this note were completed with a voice recognition program.      CC: Simon Morgan,

## 2024-12-31 ENCOUNTER — OFFICE VISIT (OUTPATIENT)
Dept: PULMONOLOGY | Facility: CLINIC | Age: 79
End: 2024-12-31
Payer: MEDICARE

## 2024-12-31 VITALS
HEIGHT: 63 IN | HEART RATE: 89 BPM | SYSTOLIC BLOOD PRESSURE: 130 MMHG | OXYGEN SATURATION: 97 % | BODY MASS INDEX: 31.54 KG/M2 | DIASTOLIC BLOOD PRESSURE: 72 MMHG | WEIGHT: 178 LBS

## 2024-12-31 DIAGNOSIS — J45.909 PERSISTENT ASTHMA WITHOUT COMPLICATION, UNSPECIFIED ASTHMA SEVERITY: ICD-10-CM

## 2024-12-31 DIAGNOSIS — J30.2 SEASONAL AND PERENNIAL ALLERGIC RHINITIS: Primary | ICD-10-CM

## 2024-12-31 DIAGNOSIS — R06.02 SHORTNESS OF BREATH: ICD-10-CM

## 2024-12-31 DIAGNOSIS — J30.89 SEASONAL AND PERENNIAL ALLERGIC RHINITIS: Primary | ICD-10-CM

## 2024-12-31 RX ORDER — METOPROLOL SUCCINATE 25 MG/1
25 TABLET, EXTENDED RELEASE ORAL DAILY
COMMUNITY

## 2024-12-31 RX ORDER — CALCIUM CARBONATE/VITAMIN D3 600 MG-10
1 TABLET ORAL DAILY
COMMUNITY
Start: 2024-05-28

## 2024-12-31 RX ORDER — FLUTICASONE FUROATE AND VILANTEROL 100; 25 UG/1; UG/1
1 POWDER RESPIRATORY (INHALATION) DAILY
COMMUNITY
Start: 2024-11-25

## 2024-12-31 RX ORDER — ALBUTEROL SULFATE 90 UG/1
INHALANT RESPIRATORY (INHALATION)
COMMUNITY

## 2025-01-02 ENCOUNTER — TELEPHONE (OUTPATIENT)
Dept: PULMONOLOGY | Facility: CLINIC | Age: 80
End: 2025-01-02
Payer: MEDICARE

## 2025-01-02 RX ORDER — BUDESONIDE AND FORMOTEROL FUMARATE DIHYDRATE 160; 4.5 UG/1; UG/1
2 AEROSOL RESPIRATORY (INHALATION)
Qty: 10.2 G | Refills: 3 | Status: SHIPPED | OUTPATIENT
Start: 2025-01-02 | End: 2025-01-02 | Stop reason: SDUPTHER

## 2025-01-02 RX ORDER — BUDESONIDE AND FORMOTEROL FUMARATE DIHYDRATE 160; 4.5 UG/1; UG/1
2 AEROSOL RESPIRATORY (INHALATION)
Qty: 3 EACH | Refills: 3 | Status: SHIPPED | OUTPATIENT
Start: 2025-01-02

## 2025-01-02 NOTE — TELEPHONE ENCOUNTER
Sent to [facility] instead due to payment cheaper. Pt notified and verbalized appreciation.

## 2025-01-02 NOTE — TELEPHONE ENCOUNTER
Pt called today stating that her insurance is requesting a prescription for Breynna 160 needing to be sent to [facility] to try and replace Breo 100. Will this be okay to alternate and may be cheaper?

## 2025-02-10 ENCOUNTER — TELEPHONE (OUTPATIENT)
Dept: PULMONOLOGY | Facility: CLINIC | Age: 80
End: 2025-02-10
Payer: MEDICARE

## 2025-02-10 DIAGNOSIS — R91.1 PULMONARY NODULE: Primary | ICD-10-CM

## 2025-02-10 NOTE — TELEPHONE ENCOUNTER
Attempted to contact patient regarding her most recent CT result.    Does have a 5 mm left lower lobe pulmonary nodule of unknown chronicity.  She is a lifelong non-smoker, however given her history of breast cancer I will repeat a short interval CT in 6 months for further evaluation.    Voicemail left.    Does have a follow-up appointment with us on 6/30/2025.

## 2025-02-10 NOTE — TELEPHONE ENCOUNTER
Pt called today requesting a call back once her most recent Chest CT Scan and labs that were done on 2024 from [facility].

## 2025-03-04 ENCOUNTER — OFFICE VISIT (OUTPATIENT)
Dept: CARDIOLOGY | Facility: CLINIC | Age: 80
End: 2025-03-04
Payer: MEDICARE

## 2025-03-04 VITALS
WEIGHT: 177.6 LBS | BODY MASS INDEX: 30.32 KG/M2 | SYSTOLIC BLOOD PRESSURE: 120 MMHG | HEIGHT: 64 IN | DIASTOLIC BLOOD PRESSURE: 70 MMHG | HEART RATE: 72 BPM

## 2025-03-04 DIAGNOSIS — E78.1 HYPERTRIGLYCERIDEMIA: Primary | ICD-10-CM

## 2025-03-04 DIAGNOSIS — J45.909 PERSISTENT ASTHMA WITHOUT COMPLICATION, UNSPECIFIED ASTHMA SEVERITY: ICD-10-CM

## 2025-03-04 DIAGNOSIS — E11.9 TYPE 2 DIABETES MELLITUS WITHOUT COMPLICATION, WITHOUT LONG-TERM CURRENT USE OF INSULIN: ICD-10-CM

## 2025-03-04 DIAGNOSIS — I10 ESSENTIAL HYPERTENSION: ICD-10-CM

## 2025-03-04 RX ORDER — METOPROLOL TARTRATE 50 MG
25 TABLET ORAL 2 TIMES DAILY
Qty: 90 TABLET | Refills: 3 | Status: SHIPPED | OUTPATIENT
Start: 2025-03-04

## 2025-03-04 RX ORDER — TRIAMTERENE AND HYDROCHLOROTHIAZIDE 37.5; 25 MG/1; MG/1
TABLET ORAL
Qty: 90 TABLET | Refills: 3 | Status: SHIPPED | OUTPATIENT
Start: 2025-03-04

## 2025-03-04 RX ORDER — LOSARTAN POTASSIUM 25 MG/1
25 TABLET ORAL DAILY
Qty: 90 TABLET | Refills: 3 | Status: SHIPPED | OUTPATIENT
Start: 2025-03-04

## 2025-03-04 RX ORDER — ISOSORBIDE MONONITRATE 30 MG/1
30 TABLET, EXTENDED RELEASE ORAL DAILY
Qty: 90 TABLET | Refills: 3 | Status: SHIPPED | OUTPATIENT
Start: 2025-03-04

## 2025-03-04 NOTE — PROGRESS NOTES
Chief Complaint   Patient presents with    Follow-up     Cardiac management    Lab     Last labs in chart per PCP 11/22/24.  To have another CT of chest in 4 months per Dr Torrez in [place]. Has been dx with Asthma.    Palpitations     Has noticed occasionally, yet not often.    Med Refill     Needs refills on cardiac medications-90 day     Subjective     HPI    Khushboo Werner is a 80 y.o. female with HTN, diabetes and strong family history of IHD. Stress test in 2011 for chest pain found to have hypertensive response but no ischemia. Beta blockers started. She was diagnosed with (L) breast CA in 2018 and underwent lumpectomy and radiation. She was referred back in June 2020 after she noticed increasing dyspnea on exertion. Cardiac work up repeated with Lexiscan showing normal blood pressure response and mild changes in the apex. LV function normal. Imdur started with plan for cath if symptoms persisted. Carotid US secondary to dizziness and brother passing from acute stroke showed no stenosis. Repeat cardiac work up 2/2023 showed no ischemia. She was admitted to [facility] with SOB, COPD exacerbation on 7/9/23. CXR showed emphysema, negative d dimer, BNP. Treated with steroids and antibiotics. Lisinopril changed to losartan. She was referred to Dr. Torrez, diagnosed with asthma, started Breo and albuterol.      She returns today for follow up. SOB and cough improved with inhalers. Having difficulty with insurance covering cost. She denies CP, rarely notices palpitations. Labs 8/2024, A1c 7.2%, GFR 76, normal LFT. Lipids in 2023 LDL 92, HDL 39, , . Lipids managed with diet alone. Repeat CT scan planned in 6 months for a 5 mm LLL pulmonary nodule.     Cardiac History:    Past Surgical History:   Procedure Laterality Date    CARDIOVASCULAR STRESS TEST  09/13/2011    1 Min. 36 Secs. 4.6 METS. 108% THR. BP- 220/90. EF > 65%. Negative.    CARDIOVASCULAR STRESS TEST  07/01/2020    Lexiscan- EF 77%. R/O Apical  Ischemia.    CARDIOVASCULAR STRESS TEST  02/07/2023    Lexiscan- EF > 70%. 190/60.R/O GI Artifacts    ECHO - CONVERTED  09/13/2011    Tachycardic. EF > 60%. Mild MR. RVSP- 29 mmHg.    ECHO - CONVERTED  07/01/2020    EF 65%. Trace-Mild MR    ECHO - CONVERTED  02/07/2023    EF 65%. Mild MR. RVSP- 15 mmHg    US CAROTID UNILATERAL  06/14/2021    No stenosis bilaterally      Current Outpatient Medications   Medication Sig Dispense Refill    albuterol sulfate  (90 Base) MCG/ACT inhaler INHALE 2 PUFFS EVERY 4 HOURS BY INHALATION ROUTE.      aspirin 81 MG chewable tablet Chew 1 tablet Daily.      Calcium Carbonate-Vitamin D (CALTRATE 600+D PO) Take 1 tablet by mouth Daily.      COLLAGEN PO Take  by mouth Daily.      denosumab (Prolia) 60 MG/ML solution prefilled syringe syringe Inject  under the skin into the appropriate area as directed Every 6 (Six) Months.      fexofenadine (ALLEGRA) 180 MG tablet Take 1 tablet by mouth Daily.      gabapentin (NEURONTIN) 300 MG capsule Take 1 capsule by mouth 2 (Two) Times a Day.      Glucosamine-Chondroitin 250-200 MG tablet Take 1 tablet by mouth Daily.      isosorbide mononitrate (IMDUR) 30 MG 24 hr tablet Take 1 tablet by mouth Daily. 90 tablet 3    letrozole (FEMARA) 2.5 MG tablet Take  by mouth Daily.      losartan (COZAAR) 25 MG tablet Take 1 tablet by mouth Daily. 90 tablet 3    MAGNESIUM PO Take 500 mg by mouth Daily.      metoprolol tartrate (LOPRESSOR) 50 MG tablet Take 0.5 tablets by mouth 2 (Two) Times a Day. 90 tablet 3    montelukast (SINGULAIR) 10 MG tablet Take 1 tablet by mouth Every Night.      Multiple Vitamins-Minerals (ICAPS) capsule Take 2 tablets by mouth Daily.      multivitamin with minerals tablet tablet Take 1 tablet by mouth Daily.      triamterene-hydrochlorothiazide (MAXZIDE-25) 37.5-25 MG per tablet Takes 1/2-1 tablet daily 90 tablet 3    VITAMIN D-VITAMIN K PO Take  by mouth Daily.      Zinc 50 MG tablet Take 1 tablet by mouth Daily.       No  "current facility-administered medications for this visit.     Codeine, Sulfa antibiotics, and Latex    Past Medical History:   Diagnosis Date    Asthma     Cancer 2011    Left breast     History of kidney stones     Hypertension      Social History     Socioeconomic History    Marital status:    Tobacco Use    Smoking status: Never     Passive exposure: Past    Smokeless tobacco: Never   Vaping Use    Vaping status: Never Used   Substance and Sexual Activity    Alcohol use: Not Currently    Drug use: Never    Sexual activity: Defer     Family History   Problem Relation Age of Onset    Lung disease Mother     Heart attack Father     Hypertension Sister     Cancer Sister     Kidney disease Brother     Heart disease Brother     Diabetes Brother      Review of Systems   Constitutional: Negative for decreased appetite and malaise/fatigue.   HENT: Negative.     Eyes:  Negative for blurred vision.   Cardiovascular:  Negative for chest pain, dyspnea on exertion, leg swelling, palpitations and syncope.   Respiratory:  Negative for shortness of breath and sleep disturbances due to breathing.    Endocrine: Negative.    Hematologic/Lymphatic: Negative for bleeding problem. Does not bruise/bleed easily.   Skin: Negative.    Musculoskeletal:  Negative for falls and myalgias.   Gastrointestinal:  Negative for abdominal pain, heartburn and melena.   Genitourinary:  Negative for hematuria.   Neurological:  Negative for dizziness and light-headedness.   Psychiatric/Behavioral:  Negative for altered mental status.    Allergic/Immunologic: Negative.       Objective     /70 (BP Location: Right arm, Patient Position: Sitting)   Pulse 72   Ht 162 cm (63.78\")   Wt 80.6 kg (177 lb 9.6 oz)   BMI 30.70 kg/m²     Vitals and nursing note reviewed.   Constitutional:       General: Not in acute distress.     Appearance: Well-developed. Not diaphoretic.   Eyes:      Pupils: Pupils are equal, round, and reactive to light.   HENT: "      Head: Normocephalic.   Pulmonary:      Effort: Pulmonary effort is normal. No respiratory distress.      Breath sounds: Normal breath sounds.   Cardiovascular:      Normal rate. Regular rhythm.   Pulses:     Intact distal pulses.   Edema:     Peripheral edema absent.   Abdominal:      General: Bowel sounds are normal.      Palpations: Abdomen is soft.   Musculoskeletal: Normal range of motion.      Cervical back: Normal range of motion. Skin:     General: Skin is warm and dry.   Neurological:      Mental Status: Alert and oriented to person, place, and time.        Procedures          Problem List Items Addressed This Visit          Cardiac and Vasculature    Hypertriglyceridemia - Primary    Essential hypertension    Relevant Medications    losartan (COZAAR) 25 MG tablet    metoprolol tartrate (LOPRESSOR) 50 MG tablet    triamterene-hydrochlorothiazide (MAXZIDE-25) 37.5-25 MG per tablet       Endocrine and Metabolic    Type 2 diabetes mellitus without complication, without long-term current use of insulin       Pulmonary and Pneumonias    Asthma      HTN  -well controlled at 120/70  -continue losartan, metoprolol, triamterene/hctz     Hyperlipidemia  -well controlled, LDL 92, Tri 138  -strict low sugar diet   -labs per Dr. Morgan     Diabetes  -A1c 7.2%, increased to 7.5%   -continue asp, ARB, she is not on statin     Asthma  -newly diagnosed  -managed with ICS/LABA, albuterol PRN   -following with Dr. Torrez      Abnormal stress   -nuclear images 2020, small apical ischemia  -Imdur 30 mg added  -repeat nuclear scan 2/2023 showed no ischemia, continue Imdur.      No changes made.  Cardiac status appears stable. FU 6 months, sooner as needed.     BMI is >= 30 and <35. (Class 1 Obesity). The following options were offered after discussion;: nutrition counseling/recommendations               Electronically signed by KAMLESH Roberts,  March 6, 2025 14:53 EST

## 2025-06-26 ENCOUNTER — TELEPHONE (OUTPATIENT)
Dept: PULMONOLOGY | Facility: CLINIC | Age: 80
End: 2025-06-26
Payer: MEDICARE

## 2025-06-26 NOTE — TELEPHONE ENCOUNTER
Patient called wanting to let you know that she has scheduled her CT scan for 7/8/25 in [place].

## 2025-06-30 ENCOUNTER — OFFICE VISIT (OUTPATIENT)
Dept: PULMONOLOGY | Facility: CLINIC | Age: 80
End: 2025-06-30
Payer: MEDICARE

## 2025-06-30 VITALS
SYSTOLIC BLOOD PRESSURE: 122 MMHG | WEIGHT: 178 LBS | OXYGEN SATURATION: 97 % | BODY MASS INDEX: 30.39 KG/M2 | DIASTOLIC BLOOD PRESSURE: 64 MMHG | HEIGHT: 64 IN | TEMPERATURE: 98.2 F | HEART RATE: 74 BPM

## 2025-06-30 DIAGNOSIS — Z78.9 NON-SMOKER: ICD-10-CM

## 2025-06-30 DIAGNOSIS — J45.909 PERSISTENT ASTHMA WITHOUT COMPLICATION, UNSPECIFIED ASTHMA SEVERITY: ICD-10-CM

## 2025-06-30 DIAGNOSIS — J30.2 SEASONAL AND PERENNIAL ALLERGIC RHINITIS: Primary | ICD-10-CM

## 2025-06-30 DIAGNOSIS — J30.89 SEASONAL AND PERENNIAL ALLERGIC RHINITIS: Primary | ICD-10-CM

## 2025-06-30 DIAGNOSIS — R91.1 PULMONARY NODULE: ICD-10-CM

## 2025-06-30 DIAGNOSIS — R06.02 SHORTNESS OF BREATH: ICD-10-CM

## 2025-06-30 PROCEDURE — 94729 DIFFUSING CAPACITY: CPT | Performed by: NURSE PRACTITIONER

## 2025-06-30 PROCEDURE — 3074F SYST BP LT 130 MM HG: CPT | Performed by: NURSE PRACTITIONER

## 2025-06-30 PROCEDURE — 94375 RESPIRATORY FLOW VOLUME LOOP: CPT | Performed by: NURSE PRACTITIONER

## 2025-06-30 PROCEDURE — 94726 PLETHYSMOGRAPHY LUNG VOLUMES: CPT | Performed by: NURSE PRACTITIONER

## 2025-06-30 PROCEDURE — 1160F RVW MEDS BY RX/DR IN RCRD: CPT | Performed by: NURSE PRACTITIONER

## 2025-06-30 PROCEDURE — 99214 OFFICE O/P EST MOD 30 MIN: CPT | Performed by: NURSE PRACTITIONER

## 2025-06-30 PROCEDURE — 3078F DIAST BP <80 MM HG: CPT | Performed by: NURSE PRACTITIONER

## 2025-06-30 PROCEDURE — 1159F MED LIST DOCD IN RCRD: CPT | Performed by: NURSE PRACTITIONER

## 2025-06-30 RX ORDER — FLUTICASONE FUROATE AND VILANTEROL 100; 25 UG/1; UG/1
1 POWDER RESPIRATORY (INHALATION)
Qty: 60 EACH | Refills: 3 | Status: SHIPPED | OUTPATIENT
Start: 2025-06-30

## 2025-06-30 NOTE — PROGRESS NOTES
[facility] Pulmonary Follow up    CHIEF COMPLAINT    Persistent respiratory symptoms    HISTORY OF PRESENT ILLNESS    HPI:   Khushboo Werner is a 80 y.o.female followed by pulmonary regarding her persistent respiratory symptoms.     Patient establish care with our clinic in October 2024 with Dr. Torrez regarding her persistent cough and wheezing.  PFTs performed at [facility] in August 2024 compatible with airway obstruction and restriction with a normal DLCO.  The TLC was felt to be artifactual as her single breath alveolar volume was 3.3 L which exceeded her TLC of 3.3 L.      Due to concern for underlying asthma, she was prescribed Breo 100 and albuterol as needed.  She is also on Singulair.  Prior labs did show some peripheral eosinophilia as high as 1130 back in 2023.    Per OSH chest imaging concerning for emphysema, however this was not appreciated by Dr. Torrez.    Does have allergic rhinitis on OTC antihistamines.    She is lifelong non-smoker.    Echocardiogram from 2023 did show grade 1 diastolic dysfunction and she is on diuretic therapy.    History of left breast CA s/p lumpectomy and radiation (completed in April 2019).     Interval history:   Patient was last seen in the office by me in December improved on the Breo, however medication coverage remains an issue as she receives her inhaler provide patient is a [place] with a prescription.  I encouraged her to contact her insurance to see what is the most affordable option and she was placed on Breyna 160 but never filled this prescription.    At current she remains on the Breo 100 and continues to receive this through a nonprescription location out of [place].    Given her symptoms, she also underwent a CT of the chest which showed evidence of prior granulomatous disease and a 5 mm left lower lobe pulmonary nodule of unknown chronicity therefore a repeat CT was ordered particularly given her history of breast cancer.    Denies recent ED  visits, hospitalizations, or exacerbations.     Denies fever, chills, night sweats, or hemoptysis. No recent sick contacts. No chest pain or palpitations. Denies lower extremity swelling or calf tenderness.     Patient Active Problem List   Diagnosis    Chest pressure    Metabolic syndrome    Shortness of breath    Hypertriglyceridemia    Hyperglycemia    Essential hypertension    Type 2 diabetes mellitus without complication, without long-term current use of insulin    Seasonal and perennial allergic rhinitis    Non-smoker    Asthma    5 mm LLL pulmonary nodule       Allergies   Allergen Reactions    Codeine Unknown - Low Severity    Sulfa Antibiotics Itching    Latex Itching       Current Outpatient Medications:     albuterol sulfate  (90 Base) MCG/ACT inhaler, INHALE 2 PUFFS EVERY 4 HOURS BY INHALATION ROUTE., Disp: , Rfl:     aspirin 81 MG chewable tablet, Chew 1 tablet Daily., Disp: , Rfl:     Calcium Carbonate-Vitamin D (CALTRATE 600+D PO), Take 1 tablet by mouth Daily., Disp: , Rfl:     COLLAGEN PO, Take  by mouth Daily., Disp: , Rfl:     denosumab (Prolia) 60 MG/ML solution prefilled syringe syringe, Inject  under the skin into the appropriate area as directed Every 6 (Six) Months., Disp: , Rfl:     fexofenadine (ALLEGRA) 180 MG tablet, Take 1 tablet by mouth Daily., Disp: , Rfl:     gabapentin (NEURONTIN) 300 MG capsule, Take 1 capsule by mouth 2 (Two) Times a Day., Disp: , Rfl:     Glucosamine-Chondroitin 250-200 MG tablet, Take 1 tablet by mouth Daily., Disp: , Rfl:     isosorbide mononitrate (IMDUR) 30 MG 24 hr tablet, Take 1 tablet by mouth Daily., Disp: 90 tablet, Rfl: 3    letrozole (FEMARA) 2.5 MG tablet, Take  by mouth Daily., Disp: , Rfl:     losartan (COZAAR) 25 MG tablet, Take 1 tablet by mouth Daily., Disp: 90 tablet, Rfl: 3    MAGNESIUM PO, Take 500 mg by mouth Daily., Disp: , Rfl:     metoprolol tartrate (LOPRESSOR) 50 MG tablet, Take 0.5 tablets by mouth 2 (Two) Times a Day., Disp: 90  "tablet, Rfl: 3    montelukast (SINGULAIR) 10 MG tablet, Take 1 tablet by mouth Every Night., Disp: , Rfl:     Multiple Vitamins-Minerals (ICAPS) capsule, Take 2 tablets by mouth Daily., Disp: , Rfl:     multivitamin with minerals tablet tablet, Take 1 tablet by mouth Daily., Disp: , Rfl:     triamterene-hydrochlorothiazide (MAXZIDE-25) 37.5-25 MG per tablet, Takes 1/2-1 tablet daily, Disp: 90 tablet, Rfl: 3    VITAMIN D-VITAMIN K PO, Take  by mouth Daily., Disp: , Rfl:     Zinc 50 MG tablet, Take 1 tablet by mouth Daily., Disp: , Rfl:     Fluticasone Furoate-Vilanterol (Breo Ellipta) 100-25 MCG/ACT aerosol powder , Inhale 1 puff Daily., Disp: 60 each, Rfl: 3  MEDICATION LIST AND ALLERGIES REVIEWED.    Social History     Tobacco Use    Smoking status: Never     Passive exposure: Past    Smokeless tobacco: Never   Vaping Use    Vaping status: Never Used   Substance Use Topics    Alcohol use: Not Currently    Drug use: Never       FAMILY AND SOCIAL HISTORY REVIEWED.    Review of Systems   Constitutional:  Negative for chills, fatigue and fever.   Respiratory:  Positive for cough and shortness of breath. Negative for chest tightness and wheezing.    Cardiovascular:  Negative for chest pain, palpitations and leg swelling.   Skin:  Negative for color change.   Psychiatric/Behavioral:  Negative for sleep disturbance.    All other systems reviewed and are negative.      /64 (BP Location: Left arm, Patient Position: Sitting, Cuff Size: Adult)   Pulse 74   Temp 98.2 °F (36.8 °C) (Temporal)   Ht 162 cm (63.78\")   Wt 80.7 kg (178 lb)   LMP  (LMP Unknown)   SpO2 97% Comment: Room air at rest  BMI 30.76 kg/m²     Immunization History   Administered Date(s) Administered    ABRYSVO (RSV, 60+ or pregnant women 32-36 wks) 01/23/2025    COVID-19 (MODERNA) 1st,2nd,3rd Dose Monovalent 02/03/2021, 03/05/2021    COVID-19 (MODERNA) Monovalent Original Booster 12/14/2021    Fluad Quad 65+ 10/13/2023    Fluzone High-Dose 65+YRS " 10/15/2024    Fluzone High-Dose 65+yrs 09/25/2020    Hepatitis A 01/04/2019, 07/19/2019    Pneumococcal Conjugate 20-Valent (PCV20) 12/02/2024    Pneumococcal Polysaccharide (PPSV23) 11/08/2021    Shingrix 06/21/2022, 12/19/2022    Td (TDVAX) 04/07/1998    Tdap 08/08/2024       Physical Exam  Vitals reviewed.   Constitutional:       General: She is not in acute distress.     Appearance: Normal appearance.   Cardiovascular:      Rate and Rhythm: Normal rate and regular rhythm.      Pulses: Normal pulses.      Heart sounds: Normal heart sounds.   Pulmonary:      Effort: Pulmonary effort is normal. No respiratory distress.      Breath sounds: No wheezing, rhonchi or rales.   Skin:     General: Skin is warm and dry.   Neurological:      General: No focal deficit present.      Mental Status: She is alert and oriented to person, place, and time.         RESULTS    PFTs:  6/30/2025: No airway obstruction.  No restriction.  Mild air trapping with residual volume 102%.  Normal DLCO.    8/28/2024 at [facility] reviewed    Imaging:   OSH CT of the chest performed at [facility] on 2/2/2025  Impression: 5 mm left lower lobe pulmonary nodule.  Given history of breast cancer, consider follow-up low-dose CT in 6 months.    CXR PA/lateral 10/29/2024  Cardiac silhouette is within normal limits in size  CT ray shows 11 x 29  Surgical clips over the left anterior chest  No effusions, infiltrates, consolidation  Probably unchanged comparison to prior portable film from 7/9/2023    Cardiac:   Results for orders placed during the hospital encounter of 02/07/23  Adult Transthoracic Echo Complete W/ Cont if Necessary Per Protocol  Interpretation Summary    Left ventricular wall thickness is consistent with borderline concentric hypertrophy.    Left ventricular ejection fraction appears to be 61 - 65%.    Left ventricular diastolic function is consistent with (grade Ia w/high LAP) impaired relaxation.    No aortic valve  "regurgitation or stenosis is present.    Mild mitral valve regurgitation is present.    Calculated right ventricular systolic pressure from tricuspid regurgitation is 15 mmHg.       PROBLEM LIST    Problem List Items Addressed This Visit       Shortness of breath    Seasonal and perennial allergic rhinitis - Primary    Non-smoker    Asthma    Relevant Medications    Fluticasone Furoate-Vilanterol (Breo Ellipta) 100-25 MCG/ACT aerosol powder     5 mm LLL pulmonary nodule    Relevant Medications    Fluticasone Furoate-Vilanterol (Breo Ellipta) 100-25 MCG/ACT aerosol powder        DISCUSSION    Ms. Werner was seen today for follow-up and is stable from respiratory standpoint.  She was implemented on the Breo 100 in the setting of suspected underlying asthma with improvement of her persistent respiratory symptoms.    Has had difficulty with her insurance regarding multiple inhalers and is currently receiving her Breo from a location out of [facility].  This is not a prescription nor a pharmacy.  At her last visit, I did encourage she receive this from a pharmacy, however she never filled her Breyna prescription and remains on the Breo 100 doing very well.    CT imaging as above and did show a 5 mm left lower lobe pulmonary nodule of unknown chronicity.    Asthma  Allergic rhinitis  Persistent respiratory symptoms improved after initiation of the Breo 100  Asthma is currently well controlled  Continue Breo 100 for medication management.  Rinse mouth after use to prevent thrush  She is currently receiving this out of the location [facility] without a prescription and the location is not a pharmacy  In the past it appears the Breyna 160 is \"preferred\" by her insurance but she did not fill this prescription.  Based on her request today, I did provide a written prescription from the Breo 100, however she may contact our office and I can reorder the Breyna if needed.    Have again reiterated my concern that she is " utilizing an inhaler without a prescription which poses a risk of interruption to her inhaler therapy which could lead to exacerbations down the road.  She verbalizes understanding of this.    Continue albuterol as needed  Prior labs back in 2023 did show significant peripheral eosinophilia with an AEC greater than 1000.  Currently her symptoms are well-controlled, however should they worsen I will increase her to the high-dose ICS therapy for 3 months and repeat labs for further evaluation for consideration of a biologic in the future  PFTs as above.  Trend annually.  Avoid known triggers.  Continue OTC antihistamines and nasal sprays as needed.  Educated on pursed-lip breathing technique  Encouraged to remain up-to-date on vaccinations.     5 mm left lower lobe pulmonary nodule  Noted on the OSH CT of the chest from February 2025 of unknown chronicity.  There is a CT in place tentatively due in July.  Given her history of breast cancer, we will follow this lesion for a total of 5 years to ensure stability.    Return to clinic in 1 year with PFTs or may see me sooner if needed.       I personally spent a total of 33 minutes on patient visit today including chart review, face to face with the patient obtaining the history and physical exam, review of pertinent images and tests, counseling and discussion and/or coordination of care as described above, and documentation.  Total time excludes time spent on other separate services such as performing procedures or test interpretation, if applicable.    Electronically signed by KAMLESH Ortega, 06/30/25, 12:19 PM EDT.    Please note that portions of this note were completed with a voice recognition program.      CC: Simon Morgan DO

## 2025-07-08 ENCOUNTER — HOSPITAL ENCOUNTER (OUTPATIENT)
Dept: CT IMAGING | Facility: HOSPITAL | Age: 80
Discharge: HOME | End: 2025-07-08
Admitting: NURSE PRACTITIONER
Payer: MEDICARE

## 2025-07-08 DIAGNOSIS — R91.1 PULMONARY NODULE: Primary | ICD-10-CM

## 2025-07-08 DIAGNOSIS — R91.1 PULMONARY NODULE: ICD-10-CM

## 2025-07-08 PROCEDURE — 71250 CT THORAX DX C-: CPT

## 2025-07-08 PROCEDURE — 71250 CT THORAX DX C-: CPT | Performed by: RADIOLOGY
